# Patient Record
Sex: MALE | Race: OTHER | NOT HISPANIC OR LATINO | URBAN - METROPOLITAN AREA
[De-identification: names, ages, dates, MRNs, and addresses within clinical notes are randomized per-mention and may not be internally consistent; named-entity substitution may affect disease eponyms.]

---

## 2018-06-15 ENCOUNTER — INPATIENT (INPATIENT)
Facility: HOSPITAL | Age: 63
LOS: 0 days | Discharge: HOME | DRG: 520 | End: 2018-06-16
Attending: ORTHOPAEDIC SURGERY | Admitting: ORTHOPAEDIC SURGERY
Payer: COMMERCIAL

## 2018-06-15 VITALS
SYSTOLIC BLOOD PRESSURE: 143 MMHG | WEIGHT: 162.26 LBS | TEMPERATURE: 98 F | OXYGEN SATURATION: 97 % | DIASTOLIC BLOOD PRESSURE: 63 MMHG | HEART RATE: 81 BPM | RESPIRATION RATE: 18 BRPM

## 2018-06-15 DIAGNOSIS — F43.10 POST-TRAUMATIC STRESS DISORDER, UNSPECIFIED: ICD-10-CM

## 2018-06-15 DIAGNOSIS — Z98.890 OTHER SPECIFIED POSTPROCEDURAL STATES: Chronic | ICD-10-CM

## 2018-06-15 DIAGNOSIS — Z90.49 ACQUIRED ABSENCE OF OTHER SPECIFIED PARTS OF DIGESTIVE TRACT: Chronic | ICD-10-CM

## 2018-06-15 DIAGNOSIS — M54.16 RADICULOPATHY, LUMBAR REGION: ICD-10-CM

## 2018-06-15 DIAGNOSIS — S62.609A FRACTURE OF UNSPECIFIED PHALANX OF UNSPECIFIED FINGER, INITIAL ENCOUNTER FOR CLOSED FRACTURE: Chronic | ICD-10-CM

## 2018-06-15 LAB
ALBUMIN SERPL ELPH-MCNC: 4.2 G/DL — SIGNIFICANT CHANGE UP (ref 3.3–5)
ALP SERPL-CCNC: 59 U/L — SIGNIFICANT CHANGE UP (ref 40–120)
ALT FLD-CCNC: 16 U/L — SIGNIFICANT CHANGE UP (ref 10–45)
ANION GAP SERPL CALC-SCNC: 12 MMOL/L — SIGNIFICANT CHANGE UP (ref 5–17)
APTT BLD: 28.3 SEC — SIGNIFICANT CHANGE UP (ref 27.5–37.4)
AST SERPL-CCNC: 19 U/L — SIGNIFICANT CHANGE UP (ref 10–40)
BASOPHILS NFR BLD AUTO: 0.3 % — SIGNIFICANT CHANGE UP (ref 0–2)
BILIRUB SERPL-MCNC: 0.2 MG/DL — SIGNIFICANT CHANGE UP (ref 0.2–1.2)
BUN SERPL-MCNC: 32 MG/DL — HIGH (ref 7–23)
CALCIUM SERPL-MCNC: 9 MG/DL — SIGNIFICANT CHANGE UP (ref 8.4–10.5)
CHLORIDE SERPL-SCNC: 102 MMOL/L — SIGNIFICANT CHANGE UP (ref 96–108)
CO2 SERPL-SCNC: 26 MMOL/L — SIGNIFICANT CHANGE UP (ref 22–31)
CREAT SERPL-MCNC: 0.77 MG/DL — SIGNIFICANT CHANGE UP (ref 0.5–1.3)
EOSINOPHIL NFR BLD AUTO: 2.2 % — SIGNIFICANT CHANGE UP (ref 0–6)
GLUCOSE SERPL-MCNC: 108 MG/DL — HIGH (ref 70–99)
HCT VFR BLD CALC: 42.3 % — SIGNIFICANT CHANGE UP (ref 39–50)
HGB BLD-MCNC: 14.6 G/DL — SIGNIFICANT CHANGE UP (ref 13–17)
INR BLD: 1.04 — SIGNIFICANT CHANGE UP (ref 0.88–1.16)
LYMPHOCYTES # BLD AUTO: 32.2 % — SIGNIFICANT CHANGE UP (ref 13–44)
MCHC RBC-ENTMCNC: 29.4 PG — SIGNIFICANT CHANGE UP (ref 27–34)
MCHC RBC-ENTMCNC: 34.5 G/DL — SIGNIFICANT CHANGE UP (ref 32–36)
MCV RBC AUTO: 85.1 FL — SIGNIFICANT CHANGE UP (ref 80–100)
MONOCYTES NFR BLD AUTO: 7.9 % — SIGNIFICANT CHANGE UP (ref 2–14)
NEUTROPHILS NFR BLD AUTO: 57.4 % — SIGNIFICANT CHANGE UP (ref 43–77)
PLATELET # BLD AUTO: 209 K/UL — SIGNIFICANT CHANGE UP (ref 150–400)
POTASSIUM SERPL-MCNC: 3.8 MMOL/L — SIGNIFICANT CHANGE UP (ref 3.5–5.3)
POTASSIUM SERPL-SCNC: 3.8 MMOL/L — SIGNIFICANT CHANGE UP (ref 3.5–5.3)
PROT SERPL-MCNC: 7.3 G/DL — SIGNIFICANT CHANGE UP (ref 6–8.3)
PROTHROM AB SERPL-ACNC: 11.5 SEC — SIGNIFICANT CHANGE UP (ref 9.8–12.7)
RBC # BLD: 4.97 M/UL — SIGNIFICANT CHANGE UP (ref 4.2–5.8)
RBC # FLD: 12.5 % — SIGNIFICANT CHANGE UP (ref 10.3–16.9)
SODIUM SERPL-SCNC: 140 MMOL/L — SIGNIFICANT CHANGE UP (ref 135–145)
WBC # BLD: 10.6 K/UL — HIGH (ref 3.8–10.5)
WBC # FLD AUTO: 10.6 K/UL — HIGH (ref 3.8–10.5)

## 2018-06-15 PROCEDURE — 71045 X-RAY EXAM CHEST 1 VIEW: CPT | Mod: 26

## 2018-06-15 PROCEDURE — 93010 ELECTROCARDIOGRAM REPORT: CPT

## 2018-06-15 PROCEDURE — 99285 EMERGENCY DEPT VISIT HI MDM: CPT | Mod: 25

## 2018-06-15 RX ORDER — MORPHINE SULFATE 50 MG/1
4 CAPSULE, EXTENDED RELEASE ORAL ONCE
Qty: 0 | Refills: 0 | Status: DISCONTINUED | OUTPATIENT
Start: 2018-06-15 | End: 2018-06-15

## 2018-06-15 RX ORDER — OXYCODONE HYDROCHLORIDE 5 MG/1
5 TABLET ORAL EVERY 4 HOURS
Qty: 0 | Refills: 0 | Status: DISCONTINUED | OUTPATIENT
Start: 2018-06-15 | End: 2018-06-16

## 2018-06-15 RX ORDER — CLONAZEPAM 1 MG
1 TABLET ORAL
Qty: 0 | Refills: 0 | COMMUNITY

## 2018-06-15 RX ORDER — OXYCODONE HYDROCHLORIDE 5 MG/1
10 TABLET ORAL EVERY 4 HOURS
Qty: 0 | Refills: 0 | Status: DISCONTINUED | OUTPATIENT
Start: 2018-06-15 | End: 2018-06-16

## 2018-06-15 RX ORDER — CEFAZOLIN SODIUM 1 G
2000 VIAL (EA) INJECTION EVERY 8 HOURS
Qty: 0 | Refills: 0 | Status: COMPLETED | OUTPATIENT
Start: 2018-06-16 | End: 2018-06-16

## 2018-06-15 RX ORDER — TRAZODONE HCL 50 MG
1 TABLET ORAL
Qty: 0 | Refills: 0 | COMMUNITY

## 2018-06-15 RX ORDER — METOCLOPRAMIDE HCL 10 MG
10 TABLET ORAL EVERY 6 HOURS
Qty: 0 | Refills: 0 | Status: DISCONTINUED | OUTPATIENT
Start: 2018-06-15 | End: 2018-06-16

## 2018-06-15 RX ORDER — TRAZODONE HCL 50 MG
100 TABLET ORAL DAILY
Qty: 0 | Refills: 0 | Status: DISCONTINUED | OUTPATIENT
Start: 2018-06-15 | End: 2018-06-16

## 2018-06-15 RX ORDER — MORPHINE SULFATE 50 MG/1
4 CAPSULE, EXTENDED RELEASE ORAL EVERY 4 HOURS
Qty: 0 | Refills: 0 | Status: DISCONTINUED | OUTPATIENT
Start: 2018-06-15 | End: 2018-06-16

## 2018-06-15 RX ORDER — CLONAZEPAM 1 MG
0.5 TABLET ORAL DAILY
Qty: 0 | Refills: 0 | Status: DISCONTINUED | OUTPATIENT
Start: 2018-06-15 | End: 2018-06-16

## 2018-06-15 RX ORDER — SODIUM CHLORIDE 9 MG/ML
1000 INJECTION INTRAMUSCULAR; INTRAVENOUS; SUBCUTANEOUS
Qty: 0 | Refills: 0 | Status: DISCONTINUED | OUTPATIENT
Start: 2018-06-15 | End: 2018-06-16

## 2018-06-15 RX ADMIN — MORPHINE SULFATE 4 MILLIGRAM(S): 50 CAPSULE, EXTENDED RELEASE ORAL at 09:15

## 2018-06-15 RX ADMIN — MORPHINE SULFATE 4 MILLIGRAM(S): 50 CAPSULE, EXTENDED RELEASE ORAL at 09:06

## 2018-06-15 RX ADMIN — SODIUM CHLORIDE 100 MILLILITER(S): 9 INJECTION INTRAMUSCULAR; INTRAVENOUS; SUBCUTANEOUS at 19:55

## 2018-06-15 RX ADMIN — Medication 100 MILLIGRAM(S): at 23:19

## 2018-06-15 RX ADMIN — SODIUM CHLORIDE 100 MILLILITER(S): 9 INJECTION INTRAMUSCULAR; INTRAVENOUS; SUBCUTANEOUS at 09:05

## 2018-06-15 NOTE — DISCHARGE NOTE ADULT - MEDICATION SUMMARY - MEDICATIONS TO TAKE
I will START or STAY ON the medications listed below when I get home from the hospital:    oxyCODONE-acetaminophen 5 mg-325 mg oral tablet  -- 1 tab(s) by mouth 4 times a day, As Needed MDD:4 for Severe Pain  -- Caution federal law prohibits the transfer of this drug to any person other  than the person for whom it was prescribed.  May cause drowsiness.  Alcohol may intensify this effect.  Use care when operating dangerous machinery.  This prescription cannot be refilled.  This product contains acetaminophen.  Do not use  with any other product containing acetaminophen to prevent possible liver damage.  Using more of this medication than prescribed may cause serious breathing problems.    -- Indication: For Lumbar radiculopathy, acute    KlonoPIN 0.5 mg oral tablet  -- 1 tab(s) by mouth once a day, As Needed  -- Indication: For Home med    traZODone 100 mg oral tablet  -- 1 tab(s) by mouth once a day  -- Indication: For Home med

## 2018-06-15 NOTE — ED ADULT NURSE NOTE - OBJECTIVE STATEMENT
64 y/o male sent from PCP for evaluation of nerve pain for possible spinal surgery. Pt. reports intense pain at lumbar region whereby pain radiates down left leg. Pain prevents pt from ambulating effectively. Speaks full sentences, MAEx4, has unlabored breathing. Abd soft nt nd. Skin dry, warm.

## 2018-06-15 NOTE — DISCHARGE NOTE ADULT - PATIENT PORTAL LINK FT
You can access the [facility] Patient Portal, offered by [facility], by registering with the following website: [email or web address]

## 2018-06-15 NOTE — DISCHARGE NOTE ADULT - HOSPITAL COURSE
Admitted  Surgery 6/15/18  Jania-op Antibiotics  Pain control  DVT prophylaxis  OOB/Physical Therapy

## 2018-06-15 NOTE — DISCHARGE NOTE ADULT - PLAN OF CARE
recovery No strenuous activity, heavy lifting, driving or returning to work until cleared by MD.  You may shower - keep dressing dry, no soaking in bathtubs.  Remove dressing after post op day 5-7, then leave incision open to air. Keep incision clean and dry.  Try to have regular bowel movements, take stool softener or laxative if necessary.  May take pepcid or zantac for upset stomach.  May take Aleve or naproxen instead of celebrex.  Swelling may travel all the way down leg to foot, this is normal and will subside in a few weeks.  Follow up with Dr. Barakat to schedule an appt, if you have staples or sutures they will be removed in office.  Contact your doctor if you experience: fever greater than 101.5, chills, chest pain, difficulty breathing, redness or excessive drainage around the incision, other concerns.

## 2018-06-15 NOTE — ED PROVIDER NOTE - MEDICAL DECISION MAKING DETAILS
64 yo male with herniated lumbar disc, now with worsening pain over past few days.  no neuro symptoms.  Dr Barakat is taking for procedure today

## 2018-06-15 NOTE — ED PROVIDER NOTE - OBJECTIVE STATEMENT
hx of herniated disc in lumbar back, for past few days having severe pain going down left leg, difficulty ambulating  no bowel/bladder changes, no weakness or numbness  pt spoke with Dr Barakat this morning who told him to come to hospital for possibly surgery

## 2018-06-15 NOTE — H&P ADULT - ASSESSMENT
62 yo with L4/L5 disc herniation with progressive weakness and neuro deficits  Will need surgical repair  Good exercise tolerance and no hx of cardiac issues  Will need EKG, CXR and preop labs, if all normal can be cleared for surgery as moderate  risk candidate for moderate risk procedure  Pain meds prn  Hemodynamically stable. 64 yo with L4/L5 disc herniation

## 2018-06-15 NOTE — H&P ADULT - PSH
Fracture of finger  FINGER ORIF 4TH METATARSAL  H/O hernia repair    History of appendectomy    S/P tendon repair  2-4TH METATARSAL TENDON REPAIR Fracture of finger  FINGER ORIF 4TH metacarpal  H/O hernia repair    History of appendectomy    S/P tendon repair  2-4TH metacarpal TENDON REPAIR

## 2018-06-15 NOTE — H&P ADULT - NSHPPHYSICALEXAM_GEN_ALL_CORE
General- mild distress  Spine- normal curvature, no TTP along cervical/thoracic/lumbar spine, no paraspinal tenderness, skin intact, no erythema/ecchymosis, STS  MSK: Lower extremities, skin intact, no bony tenderness along femur/tibia/fibula General- mild distress  Spine- normal curvature, no TTP along cervical/thoracic/lumbar spine, no paraspinal tenderness, skin intact, no erythema/ecchymosis, STS  MSK: Lower extremities, skin intact, no bony tenderness along femur/tibia/fibula, meta General- mild distress  Spine- normal curvature, no TTP along cervical/thoracic/lumbar spine, no paraspinal tenderness, skin intact, no erythema/ecchymosis, STS  MSK: Lower extremities, skin intact, no bony tenderness along femur/tibia/fibula, metatarsals. SLT INTACT, DP/PT 2+, EHL/TA/GS 5/5 right le, 4/5 with pain right le, able to SLR on right le, + SLR on left le, while examining patient he has severe muscle cramping in left posterior thigh, difficulty with hip abduction on left le, wnl left le, rectal exam refused, able to stand, difficulty with walking secondary to pain

## 2018-06-15 NOTE — DISCHARGE NOTE ADULT - CARE PROVIDER_API CALL
Justice Barakat (MD), Orthopaedic Surgery  [address]  [address]  [place], [place] [address]  Phone: [phone]  Fax: [phone]

## 2018-06-15 NOTE — H&P ADULT - REASON FOR ADMISSION
Progressive LE weakness, L4/L5 Disc Herniation Progressive LE weakness, L4/L5 Disc Herniation, Unable to ambulate, Smoker

## 2018-06-15 NOTE — DISCHARGE NOTE ADULT - CARE PLAN
Principal Discharge DX:	Lumbar radiculopathy, acute  Goal:	recovery  Assessment and plan of treatment:	No strenuous activity, heavy lifting, driving or returning to work until cleared by MD.  You may shower - keep dressing dry, no soaking in bathtubs.  Remove dressing after post op day 5-7, then leave incision open to air. Keep incision clean and dry.  Try to have regular bowel movements, take stool softener or laxative if necessary.  May take pepcid or zantac for upset stomach.  May take Aleve or naproxen instead of celebrex.  Swelling may travel all the way down leg to foot, this is normal and will subside in a few weeks.  Follow up with Dr. Barakat to schedule an appt, if you have staples or sutures they will be removed in office.  Contact your doctor if you experience: fever greater than 101.5, chills, chest pain, difficulty breathing, redness or excessive drainage around the incision, other concerns.

## 2018-06-15 NOTE — ED ADULT TRIAGE NOTE - CHIEF COMPLAINT QUOTE
My Dr, Dr Hunt told me to come to the ER to be evaluated for possible spinal surgery.  No traumatic injury.

## 2018-06-15 NOTE — H&P ADULT - HISTORY OF PRESENT ILLNESS
PAST MEDICAL & SURGICAL HISTORY:      MEDICATIONS  (STANDING):  sodium chloride 0.9%. 1000 milliLiter(s) (100 mL/Hr) IV Continuous <Continuous>    MEDICATIONS  (PRN):  metoclopramide Injectable 10 milliGRAM(s) IV Push every 6 hours PRN Nausea and/or Vomiting  morphine  - Injectable 4 milliGRAM(s) IV Push every 4 hours PRN Severe Pain  oxyCODONE    IR 10 milliGRAM(s) Oral every 4 hours PRN Moderate Pain  oxyCODONE    IR 5 milliGRAM(s) Oral every 4 hours PRN Mild Pain    No Known Allergies      Vital Signs Last 24 Hrs  T(C): 36.8 (15 Fritz 2018 07:52), Max: 36.8 (15 Fritz 2018 07:52)  T(F): 98.2 (15 Fritz 2018 07:52), Max: 98.2 (15 Fritz 2018 07:52)  HR: 81 (15 Fritz 2018 07:52) (81 - 81)  BP: 143/63 (15 Fritz 2018 07:52) (143/63 - 143/63)  BP(mean): --  RR: 18 (15 Fritz 2018 07:52) (18 - 18)  SpO2: 97% (15 Fritz 2018 07:52) (97% - 97%)    Handoff  MEWS Score  BACK PAIN                                MD  consult reviewed       ROS                          + reviewed  H/P                          NO CHANGE  GENERAL                 + awake           +alert        -confused          - lethargic  CARDIOVASC          -chest pain    -palpitation         -SOB           -ZELAYA  PULMONARY          -cough        -congestion             -wheezing  ENT                           -hoarseness         -sore throat      Gastrointestinal      -nausea         -vomitting         -diarrhea          -pain                                    -incontinent       -dysuria         -frequency       -retention      MS                              -weakness      PSYCH                        +awake           -agitation         -dementia    -delerium  SKIN                            - dry              -rash           -decubitus    PHYSICAL  EXAMINATION     General: Well developed; well nourished; in no acute distress  Eyes: PERRL, EOM intact; conjunctiva and sclera clear  Head: Normocephalic; atraumatic  Neck: Supple; non tender; no masses  Respiratory: No wheezes, rales or rhonchi  Cardiovascular: Regular rate and rhythm. S1 and S2 Normal; No murmurs, gallops or rubs  Gastrointestinal: Soft non-tender non-distended; Normal bowel sounds; No hepatosplenomegaly  Extremities:  No clubbing, cyanosis or edema            Peripheral pulses palpable 2+ bilaterally  Neurological: Alert and oriented x 3, CN 2-12 grossly intact    Assesment/PLAN  63yMale    VTE prophylaxis:     reviewed  ANTIBIOTICS :         reviewed            NONE  DISPOSITION  :             HOME 62 yo Male with PMH sign for PTSD (childhood trauma), anxiety, smoker, untreated hypercholesterol presents with 10/10 Left leg pain since Tuesday with progressive weakness and unable to ambulate. He reports the pain is sharp and interferes with all activities of daily living.   He smokes 1/2 ppd for past 5 years and receives ketamine infusions every 6 weeks to treat his PTSD.  No active cardiac issues. No sign family hx of cardiac dz (F had MI but at age 80 and no contact with mother so her med history is unknown)    Pt able to ambulate >1 mile without any difficulty. No CP, SOB or palpitations.   No hx of DM.    Of note, pt does not want to stop smoking, as he feels it helps tremendously with his symptoms of PTSD. Risks reviewed with patient.     ROS as per chart.     PAST SURGICAL HISTORY:  Finger fracture   Hernia repair    MEDS:  Trazadone 100  Klonipin .5  Ketamine infusions q6 weeks      MEDICATIONS  (STANDING):  sodium chloride 0.9%. 1000 milliLiter(s) (100 mL/Hr) IV Continuous <Continuous>    MEDICATIONS  (PRN):  metoclopramide Injectable 10 milliGRAM(s) IV Push every 6 hours PRN Nausea and/or Vomiting  morphine  - Injectable 4 milliGRAM(s) IV Push every 4 hours PRN Severe Pain  oxyCODONE    IR 10 milliGRAM(s) Oral every 4 hours PRN Moderate Pain  oxyCODONE    IR 5 milliGRAM(s) Oral every 4 hours PRN Mild Pain    No Known Allergies      Vital Signs Last 24 Hrs  T(C): 36.8 (15 Fritz 2018 07:52), Max: 36.8 (15 Fritz 2018 07:52)  T(F): 98.2 (15 Fritz 2018 07:52), Max: 98.2 (15 Fritz 2018 07:52)  HR: 81 (15 Fritz 2018 07:52) (81 - 81)  BP: 143/63 (15 Fritz 2018 07:52) (143/63 - 143/63)  BP(mean): --  RR: 18 (15 Fritz 2018 07:52) (18 - 18)  SpO2: 97% (15 Fritz 2018 07:52) (97% - 97%)    Handoff  MEWS Score  BACK PAIN                                MD  consult reviewed       ROS                          + reviewed  H/P                          NO CHANGE  GENERAL                 + awake           +alert        -confused          - lethargic  CARDIOVASC          -chest pain    -palpitation         -SOB           -ZELAYA  PULMONARY          -cough        -congestion             -wheezing  ENT                           -hoarseness         -sore throat      Gastrointestinal      -nausea         -vomitting         -diarrhea          -pain                                    -incontinent       -dysuria         -frequency       -retention      MS                              +weakness (4/5)    LLE >RLE  PSYCH                        +awake           -agitation         -dementia    -delerium  SKIN                            - dry              -rash           -decubitus    PHYSICAL  EXAMINATION     General: Well developed; well nourished; in no acute distress  Eyes: PERRL, EOM intact; conjunctiva and sclera clear  Head: Normocephalic; atraumatic  Neck: Supple; non tender; no masses  Respiratory: No wheezes, rales or rhonchi  Cardiovascular: Regular rate and rhythm. S1 and S2 Normal; No murmurs, gallops or rubs  Gastrointestinal: Soft non-tender non-distended; Normal bowel sounds; No hepatosplenomegaly  Extremities:  No clubbing, cyanosis or edema            Peripheral pulses palpable 2+ bilaterally  Neurological: Alert and oriented x 3, CN 2-12 grossly intact 64 yo Male c/o low back pain/left le weakness x 1 week. Pt. states he was gardening at home for 4 hours when he started to have low back pain with radiation down left le. Pt. describes pain as stabbing/throbbing below left buttock. Pt. has difficulty with walking in which he has started to use cane assistance. Pt. states he went to see an orthopedist and had Xrays/MRI taken. Pt. was then referred to see Dr. Barakat due to MRI showing disc herniation. Pt. states pain moves below the left buttock to left thigh. Pt. "doesn't know where pain will be" and will get a sudden shock of pain with muscle cramping. Denies any bowel or bladder incontinence. Pt. reports difficulty with using the bathroom only due to positioning with left leg. Denies any numbness or tingling in b/l les.     Of note, pt does not want to stop smoking, as he feels it helps tremendously with his symptoms of PTSD. Risks reviewed with patient.     ROS as per chart.     PAST SURGICAL HISTORY:  Finger fracture   Hernia repair    MEDS:  Trazadone 100  Klonipin .5  Ketamine infusions q6 weeks      MEDICATIONS  (STANDING):  sodium chloride 0.9%. 1000 milliLiter(s) (100 mL/Hr) IV Continuous <Continuous>    MEDICATIONS  (PRN):  metoclopramide Injectable 10 milliGRAM(s) IV Push every 6 hours PRN Nausea and/or Vomiting  morphine  - Injectable 4 milliGRAM(s) IV Push every 4 hours PRN Severe Pain  oxyCODONE    IR 10 milliGRAM(s) Oral every 4 hours PRN Moderate Pain  oxyCODONE    IR 5 milliGRAM(s) Oral every 4 hours PRN Mild Pain    No Known Allergies      Vital Signs Last 24 Hrs  T(C): 36.8 (15 Fritz 2018 07:52), Max: 36.8 (15 Fritz 2018 07:52)  T(F): 98.2 (15 Fritz 2018 07:52), Max: 98.2 (15 Fritz 2018 07:52)  HR: 81 (15 Fritz 2018 07:52) (81 - 81)  BP: 143/63 (15 Fritz 2018 07:52) (143/63 - 143/63)  BP(mean): --  RR: 18 (15 Fritz 2018 07:52) (18 - 18)  SpO2: 97% (15 Fritz 2018 07:52) (97% - 97%)    Handoff  MEWS Score  BACK PAIN                                MD  consult reviewed       ROS                          + reviewed  H/P                          NO CHANGE  GENERAL                 + awake           +alert        -confused          - lethargic  CARDIOVASC          -chest pain    -palpitation         -SOB           -ZELAYA  PULMONARY          -cough        -congestion             -wheezing  ENT                           -hoarseness         -sore throat      Gastrointestinal      -nausea         -vomitting         -diarrhea          -pain                                    -incontinent       -dysuria         -frequency       -retention      MS                              +weakness (4/5)    LLE >RLE  PSYCH                        +awake           -agitation         -dementia    -delerium  SKIN                            - dry              -rash           -decubitus    PHYSICAL  EXAMINATION     General: Well developed; well nourished; in no acute distress  Eyes: PERRL, EOM intact; conjunctiva and sclera clear  Head: Normocephalic; atraumatic  Neck: Supple; non tender; no masses  Respiratory: No wheezes, rales or rhonchi  Cardiovascular: Regular rate and rhythm. S1 and S2 Normal; No murmurs, gallops or rubs  Gastrointestinal: Soft non-tender non-distended; Normal bowel sounds; No hepatosplenomegaly  Extremities:  No clubbing, cyanosis or edema            Peripheral pulses palpable 2+ bilaterally  Neurological: Alert and oriented x 3, CN 2-12 grossly intact 62 yo Male c/o low back pain/left le weakness x 1 week. Pt. states he was gardening at home for 4 hours when he started to have low back pain with radiation down left le. Pt. describes pain as stabbing/throbbing below left buttock. Pt. has difficulty with walking in which he has started to use cane assistance. Pt. states he went to see an orthopedist and had Xrays/MRI taken. Pt. was then referred to see Dr. Barakat due to MRI showing disc herniation. Pt. states pain moves below the left buttock to left thigh. Pt. "doesn't know where pain will be" and will get a sudden shock of pain with muscle cramping. Denies any bowel or bladder incontinence. Pt. reports difficulty with using the bathroom only due to positioning with left leg. Denies any numbness or tingling in b/l les. Pt. was sent to ER today and will have left L4-L5 LAMI/DISC performed due to left leg pain and not wanting to do RACHEL.

## 2018-06-15 NOTE — PROGRESS NOTE ADULT - SUBJECTIVE AND OBJECTIVE BOX
Patient seen and examined in the emergency room and admitted to [facility].  He presents with severe, incapacitating left lower extremity radicular pain and progressive weakness, now unable to bare weight on the extremity and stand erect.  He now describes that he has been unable to void.  His examnination is notable for EHL 3/5 and DF 4/5 and knee flexion 3/5 with markedly positive straight leg raise.  MRI shows spinal stenosis at L3-4 and L4-5 (severe) with superimposed disc herniation with proximal migration compressing the left sided nerve roots consistent with his acute presentation.     The condition and treatment options were discussed with the patient and his partner.  The risks, benefits and alternatives to lumbar laminotomy/micro-discectomy were discussed. The primary goal of surgery is to relieve the acute left lower extremity radicular pain while understanding that no guarantee can be made for full recovery of his neurological deficit/ progressive weakness and the underlying spinal stenosis is not being addressed.  He is aware of the possible need for further surgery at some point in the future for the stenosis, but that the primary goal of this surgery is relief of the acute, radicular pain. The complications of surgery were discussed with the patient and his partner and include, but are not limited to, wound problems, infection, bleeding, vascular injury, csf leak, recurrent displaced disc (5-10%), symptomatic spinal stenosis, instability, need for further surgery including fusion/instrumention, persistent symptoms and/or weakness, dvt, pe, mi stroke and death.  All questions were answered and informed consent was obtained.  The patient does smoke cigarrettes and he is advised to the increased complications with smokes compared to nonsmokers including but not limited to recurrent disc displacement, wound healing, infection and vascular and pulmonary issues    **Notes/documentation by others on this electronic chart may be signed by me, due to electronic record signature requirementsm, but are not reviewed for accuracy.

## 2018-06-15 NOTE — CONSULT NOTE ADULT - SUBJECTIVE AND OBJECTIVE BOX
Patient is a 63y old  Male who presents with a chief complaint of back pain and radicular symptoms in the LLE with inability to ambulate      HPI: - no bowel or bladder symptoms      Allergies  No Known Allergies      Health Issues  MEWS Score  BACK PAIN        FAMILY HISTORY:      MEDICATIONS  (STANDING):    MEDICATIONS  (PRN):      PAST MEDICAL & SURGICAL HISTORY:      Labs              Radiology:    Physical Exam    MENTAL STATUS  -Level of Consciousness- awake    Orientation- person, place time  Language- aphasia/ dysarthria- nl  Memory- recent and remote- nl      Cranial Nerve 1- 12  Pupils- equal and reactive  Eye movements-  Facial - no asymmetry   Lower CN-nl    Gait and Station- unable to walk even with assistance    MOTOR  Upper-nl  Lower- LLE weakness- L5    Reflexes-  Left AJ    Sensation- no sensory loss    Cerebellar- no tremors    vascular -no bruits    Assessment- Lumbar radiculopathy   No evidence of myelopathy or neuropathy    Plan as per DR Barakat

## 2018-06-15 NOTE — H&P ADULT - PROBLEM SELECTOR PLAN 1
Admit to orthopedics FOR L4-L5 LAMI/DISCECTOMY. CLEARED BY DR. RODRIGUEZ.   NPO/IVF, PREOP LABS, EKG. CXR, ADDED ON. Admit to orthopedics FOR L4-L5 LAMI/DISCECTOMY. CLEARED BY DR. NUÑEZ.   NPO/IVF, PREOP LABS, EKG. CXR, ADDED ON.  Dr. Nuñez examined patient in ER, pt. cleared for sx pending normal labs/cxr/ekg.

## 2018-06-16 VITALS
HEART RATE: 52 BPM | SYSTOLIC BLOOD PRESSURE: 107 MMHG | RESPIRATION RATE: 17 BRPM | DIASTOLIC BLOOD PRESSURE: 63 MMHG | OXYGEN SATURATION: 91 % | TEMPERATURE: 98 F

## 2018-06-16 RX ADMIN — OXYCODONE HYDROCHLORIDE 10 MILLIGRAM(S): 5 TABLET ORAL at 09:50

## 2018-06-16 RX ADMIN — Medication 100 MILLIGRAM(S): at 06:49

## 2018-06-16 RX ADMIN — OXYCODONE HYDROCHLORIDE 10 MILLIGRAM(S): 5 TABLET ORAL at 05:45

## 2018-06-16 RX ADMIN — OXYCODONE HYDROCHLORIDE 10 MILLIGRAM(S): 5 TABLET ORAL at 08:56

## 2018-06-16 RX ADMIN — OXYCODONE HYDROCHLORIDE 10 MILLIGRAM(S): 5 TABLET ORAL at 06:45

## 2018-06-16 NOTE — PROGRESS NOTE ADULT - SUBJECTIVE AND OBJECTIVE BOX
HPI:  64 yo Male c/o low back pain/left le weakness POD #1 SP  left L4-L5 LAMI/DISC   Sharp left pain has resolved and strength now improved 5/5.  +ambulating.   Pt has some nausea and dysphagia overnight.         PAST MEDICAL & SURGICAL HISTORY:  PTSD (post-traumatic stress disorder): from childhood trauma  S/P tendon repair: 2-4TH metacarpal TENDON REPAIR  Fracture of finger: FINGER ORIF 4TH metacarpal  H/O hernia repair  History of appendectomy      MEDICATIONS  (STANDING):  sodium chloride 0.9%. 1000 milliLiter(s) (100 mL/Hr) IV Continuous <Continuous>  traZODone 100 milliGRAM(s) Oral daily    MEDICATIONS  (PRN):  clonazePAM Tablet 0.5 milliGRAM(s) Oral daily PRN home med  metoclopramide Injectable 10 milliGRAM(s) IV Push every 6 hours PRN Nausea and/or Vomiting  morphine  - Injectable 4 milliGRAM(s) IV Push every 4 hours PRN Severe Pain  oxyCODONE    IR 10 milliGRAM(s) Oral every 4 hours PRN Moderate Pain  oxyCODONE    IR 5 milliGRAM(s) Oral every 4 hours PRN Mild Pain    No Known Allergies      Vital Signs Last 24 Hrs  T(C): 36.7 (16 Jun 2018 08:38), Max: 36.8 (15 Fritz 2018 14:17)  T(F): 98.1 (16 Jun 2018 08:38), Max: 98.3 (15 Fritz 2018 14:17)  HR: 52 (16 Jun 2018 08:38) (42 - 56)  BP: 107/63 (16 Jun 2018 08:38) (93/58 - 146/66)  BP(mean): 97 (15 Fritz 2018 21:15) (68 - 101)  RR: 17 (16 Jun 2018 08:38) (8 - 17)  SpO2: 91% (16 Jun 2018 08:38) (91% - 99%)    LUMBAR RADICULOPATH, ACUTE  Handoff  MEWS Score  PTSD (post-traumatic stress disorder)  No pertinent past medical history  Lumbar radiculopathy, acute  Lumbar radiculopathy, acute  PTSD (post-traumatic stress disorder)  Lumbar radiculopathy, acute  S/P tendon repair  Fracture of finger  H/O hernia repair  History of appendectomy  BACK PAIN      PT/INR - ( 15 Fritz 2018 09:12 )   PT: 11.5 sec;   INR: 1.04          PTT - ( 15 Fritz 2018 09:12 )  PTT:28.3 sec                                14.6   10.6  )-----------( 209      ( 15 Fritz 2018 09:12 )             42.3       06-15    140  |  102  |  32<H>  ----------------------------<  108<H>  3.8   |  26  |  0.77    Ca    9.0      15 Fritz 2018 09:12    TPro  7.3  /  Alb  4.2  /  TBili  0.2  /  DBili  x   /  AST  19  /  ALT  16  /  AlkPhos  59  06-15          MD  consult reviewed       ROS                          + reviewed  H/P                          NO CHANGE  GENERAL                 + awake           +alert        -confused          - lethargic  CARDIOVASC          -chest pain    -palpitation         -SOB           -ZELAYA  PULMONARY          -cough        -congestion             -wheezing  ENT                           -hoarseness         -sore throat      Gastrointestinal      -nausea         -vomitting         -diarrhea          -pain                                    -incontinent       -dysuria         -frequency       -retention      MS                              -weakness      PSYCH                        +awake           -agitation         -dementia    -delerium  SKIN                            - dry              -rash           -decubitus    PHYSICAL  EXAMINATION     General: Well developed; well nourished; in no acute distress  Eyes: PERRL, EOM intact; conjunctiva and sclera clear  Head: Normocephalic; atraumatic  Neck: Supple; non tender; no masses  Respiratory: No wheezes, rales or rhonchi  Cardiovascular: Regular rate and rhythm. S1 and S2 Normal; No murmurs, gallops or rubs  Gastrointestinal: Soft non-tender non-distended; Normal bowel sounds; No hepatosplenomegaly  Extremities:  No clubbing, cyanosis or edema, Anterior thigh pain - dull            Peripheral pulses palpable 2+ bilaterally  Neurological: Alert and oriented x 3, CN 2-12 grossly intact

## 2018-06-16 NOTE — PROGRESS NOTE ADULT - SUBJECTIVE AND OBJECTIVE BOX
Ortho     Pain endorsed but controlled this am. Ambulatory last night to bathroom with mild pain   Denies CP, SOB, N/V, numbness/tingling     Vital Signs Last 24 Hrs  T(C): 36.1 (06-15-18 @ 19:14), Max: 36.1 (06-15-18 @ 19:14)  T(F): 97 (06-15-18 @ 19:14), Max: 97 (06-15-18 @ 19:14)  HR: 52 (06-15-18 @ 21:15) (42 - 56)  BP: 143/83 (06-15-18 @ 21:15) (98/53 - 146/66)  BP(mean): 97 (06-15-18 @ 21:15) (68 - 101)  RR: 11 (06-15-18 @ 21:15) (8 - 15)  SpO2: 99% (06-15-18 @ 21:15) (96% - 99%)  AVSS    General: Pt Alert and oriented, NAD  DSG C/D/I  Pulses: 2+ PT  Sensation:  SILT s/s/sp/dp/t bilat  Motor: EHL/FHL/TA/GS 5/5 bilat                           14.6   10.6  )-----------( 209      ( 15 Fritz 2018 09:12 )             42.3   15 Fritz 2018 09:12    140    |  102    |  32     ----------------------------<  108    3.8     |  26     |  0.77       TPro  7.3    /  Alb  4.2    /  TBili  0.2    /  DBili  x      /  AST  19     /  ALT  16     /  AlkPhos  59     15 Fritz 2018 09:12      A/P: 63yMale s/p L L4-L5 lami/disc   - Stable  - Pain Control  - DVT ppx: SCDs  - Post op abx: Ancef   - PT, WBS:  WBAT     Ortho Pager [phone]

## 2018-06-16 NOTE — PROGRESS NOTE ADULT - SUBJECTIVE AND OBJECTIVE BOX
Did well overnight except with minimal surgical discomfort, described as mild.    No lower extremity symptoms except some lateral hip, mild discomfort over greater trochanter.  No radiating pain.  has been out of bed ambulating.  Strength in leg is significantly improved; walking upright and normal. tolerating diet; Voiding without difficulty; using incentive spirometry (I reviewed instructions personally with patient)    PE: motor 5/5 except 4/5 knee flexion and 4/5 EHL.  dressing dry and intact  sensory intact  no calf tenderness     imp;: doing well postop L4-5 laminotomy   Cleared for discharge this AM after clearance by Physical therapy  no lifting, twisting, bending  call for any questions, fevers, chills or any wound drainage  Instructed regarding smoking and postoperative issues- he will make every attempt to stop/limit  Incentive spirometer every hour while awake  follow-up in 7-10 days  follow-up with primary care this week, routine postoperative visit  cleared to shower and wound can get wet but no bath/pool until cleared; remove dressing prior to shower and new dressing after shower

## 2018-06-16 NOTE — PROVIDER CONTACT NOTE (OTHER) - SITUATION
Pt. has been jorge l in 40s since admission, questioned provider on if patient should be on telemetry. Patient also wants mayberry cathter to be discontinued.

## 2018-06-16 NOTE — PHYSICAL THERAPY INITIAL EVALUATION ADULT - DIAGNOSIS, PT EVAL
Practice Pattern 4F: Impaired joint mobility, motor function, muscle performance, or range of motion associated with spinal disorders

## 2018-06-16 NOTE — PROGRESS NOTE ADULT - ASSESSMENT
Spoke with patient (Dr. Meraz) preoperatively and he is aware I will be assisting during today's surgery and disclosure performed.
Assesment/PLAN  63yMale PTSD (post-traumatic stress disorder)     - Stable  - Pain Control  - DVT ppx: SCDs  - Post op abx: Ancef   - PT, WBS:  WBAT   -Smoking Cessation  - Follow up with PMD regarding hypercholesterol and smoking cessation plan  - Hemodynamically Stable  - Plan discussed with Ortho and Neuro attendings

## 2018-06-16 NOTE — PROGRESS NOTE ADULT - SUBJECTIVE AND OBJECTIVE BOX
Neurology Follow up note    Name  LISANDRA WEEKS    HPI:  64 yo Male c/o low back pain/left le weakness x 1 week. Pt. states he was gardening at home for 4 hours when he started to have low back pain with radiation down left le. Pt. describes pain as stabbing/throbbing below left buttock. Pt. has difficulty with walking in which he has started to use cane assistance. Pt. states he went to see an orthopedist and had Xrays/MRI taken. Pt. was then referred to see Dr. Barakat due to MRI showing disc herniation. Pt. states pain moves below the left buttock to left thigh. Pt. "doesn't know where pain will be" and will get a sudden shock of pain with muscle cramping. Denies any bowel or bladder incontinence. Pt. reports difficulty with using the bathroom only due to positioning with left leg. Denies any numbness or tingling in b/l les. Pt. was sent to ER today and will have left L4-L5 LAMI/DISC performed due to left leg pain and not wanting to do RACHEL. (15 Fritz 2018 08:36)      Interval History - significant improvement in the LE- no radicular symptoms        REVIEW OF SYSTEMS    Vital Signs Last 24 Hrs  T(C): 36.7 (16 Jun 2018 08:38), Max: 36.8 (15 Fritz 2018 14:17)  T(F): 98.1 (16 Jun 2018 08:38), Max: 98.3 (15 Fritz 2018 14:17)  HR: 52 (16 Jun 2018 08:38) (42 - 56)  BP: 107/63 (16 Jun 2018 08:38) (93/58 - 146/66)  BP(mean): 97 (15 Fritz 2018 21:15) (68 - 101)  RR: 17 (16 Jun 2018 08:38) (8 - 17)  SpO2: 91% (16 Jun 2018 08:38) (91% - 99%)    Physical Exam-     Mental Status- awake and alert    Cranial Nerves-nl    Gait and station- no foot drop    Motor- moves all 4 extremities    Reflexes-intact    Sensation- no sensory level    Coordination- no tremors    Vascular -no bruits    Medications  clonazePAM Tablet 0.5 milliGRAM(s) Oral daily PRN  metoclopramide Injectable 10 milliGRAM(s) IV Push every 6 hours PRN  morphine  - Injectable 4 milliGRAM(s) IV Push every 4 hours PRN  oxyCODONE    IR 10 milliGRAM(s) Oral every 4 hours PRN  oxyCODONE    IR 5 milliGRAM(s) Oral every 4 hours PRN  sodium chloride 0.9%. 1000 milliLiter(s) IV Continuous <Continuous>  traZODone 100 milliGRAM(s) Oral daily      Lab      Radiology    Assessment- Lumbar radiculopathy    Plan as per Dr Barakat

## 2018-06-16 NOTE — PHYSICAL THERAPY INITIAL EVALUATION ADULT - ASSISTIVE DEVICE FOR STAIR TRANSFER, REHAB EVAL
unilateral hand held assist as patient does not have railings at home, however partner will be able to provide contact guard assistance and was instructed via video patient recorded of hand placement and how to properly guard/straight cane

## 2018-06-16 NOTE — PHYSICAL THERAPY INITIAL EVALUATION ADULT - THERAPY FREQUENCY, PT EVAL
DC PT - Patient is independent for all functional mobility including bed mobility, transfers, ambulation and stair negotiation without external assistance provided and with good dynamic stability observed for all mobility. Patient is safe to return home and has no further acute skilled PT needs.

## 2018-06-16 NOTE — PHYSICAL THERAPY INITIAL EVALUATION ADULT - ADDITIONAL COMMENTS
Patient lives with his partner in an private house in [place] with no steps to enter, however with 12 steps to reach second floor where they reside. Patient also with a third floor to his house, however states that he does not need to access it. Patient's partner will be with him 24/7 for the initial few days post discharge. For the past week he has had difficulty ambulating and thus used a cane, however before was independent for all functional mobility and ADLs

## 2018-06-18 PROCEDURE — 85610 PROTHROMBIN TIME: CPT

## 2018-06-18 PROCEDURE — 85025 COMPLETE CBC W/AUTO DIFF WBC: CPT

## 2018-06-18 PROCEDURE — 96374 THER/PROPH/DIAG INJ IV PUSH: CPT

## 2018-06-18 PROCEDURE — 86901 BLOOD TYPING SEROLOGIC RH(D): CPT

## 2018-06-18 PROCEDURE — 93005 ELECTROCARDIOGRAM TRACING: CPT

## 2018-06-18 PROCEDURE — 88304 TISSUE EXAM BY PATHOLOGIST: CPT

## 2018-06-18 PROCEDURE — 80053 COMPREHEN METABOLIC PANEL: CPT

## 2018-06-18 PROCEDURE — 95940 IONM IN OPERATNG ROOM 15 MIN: CPT

## 2018-06-18 PROCEDURE — 76000 FLUOROSCOPY <1 HR PHYS/QHP: CPT

## 2018-06-18 PROCEDURE — 71045 X-RAY EXAM CHEST 1 VIEW: CPT

## 2018-06-18 PROCEDURE — 85730 THROMBOPLASTIN TIME PARTIAL: CPT

## 2018-06-18 PROCEDURE — 36415 COLL VENOUS BLD VENIPUNCTURE: CPT

## 2018-06-18 PROCEDURE — C1889: CPT

## 2018-06-18 PROCEDURE — 99285 EMERGENCY DEPT VISIT HI MDM: CPT | Mod: 25

## 2018-06-18 PROCEDURE — 86850 RBC ANTIBODY SCREEN: CPT

## 2018-06-18 PROCEDURE — 86900 BLOOD TYPING SEROLOGIC ABO: CPT

## 2018-06-18 PROCEDURE — 97161 PT EVAL LOW COMPLEX 20 MIN: CPT

## 2018-06-20 DIAGNOSIS — M48.061 SPINAL STENOSIS, LUMBAR REGION WITHOUT NEUROGENIC CLAUDICATION: ICD-10-CM

## 2018-06-20 DIAGNOSIS — M54.16 RADICULOPATHY, LUMBAR REGION: ICD-10-CM

## 2018-06-20 DIAGNOSIS — M51.26 OTHER INTERVERTEBRAL DISC DISPLACEMENT, LUMBAR REGION: ICD-10-CM

## 2018-06-24 ENCOUNTER — INPATIENT (INPATIENT)
Facility: HOSPITAL | Age: 63
LOS: 4 days | Discharge: HOME | DRG: 455 | End: 2018-06-29
Attending: ORTHOPAEDIC SURGERY | Admitting: ORTHOPAEDIC SURGERY
Payer: COMMERCIAL

## 2018-06-24 VITALS
HEART RATE: 70 BPM | RESPIRATION RATE: 18 BRPM | WEIGHT: 154.54 LBS | SYSTOLIC BLOOD PRESSURE: 154 MMHG | OXYGEN SATURATION: 98 % | DIASTOLIC BLOOD PRESSURE: 94 MMHG | TEMPERATURE: 98 F

## 2018-06-24 DIAGNOSIS — Z90.49 ACQUIRED ABSENCE OF OTHER SPECIFIED PARTS OF DIGESTIVE TRACT: Chronic | ICD-10-CM

## 2018-06-24 DIAGNOSIS — Z98.890 OTHER SPECIFIED POSTPROCEDURAL STATES: Chronic | ICD-10-CM

## 2018-06-24 DIAGNOSIS — S62.609A FRACTURE OF UNSPECIFIED PHALANX OF UNSPECIFIED FINGER, INITIAL ENCOUNTER FOR CLOSED FRACTURE: Chronic | ICD-10-CM

## 2018-06-24 LAB
ALBUMIN SERPL ELPH-MCNC: 4.4 G/DL — SIGNIFICANT CHANGE UP (ref 3.3–5)
ALP SERPL-CCNC: 62 U/L — SIGNIFICANT CHANGE UP (ref 40–120)
ALT FLD-CCNC: 21 U/L — SIGNIFICANT CHANGE UP (ref 10–45)
ANION GAP SERPL CALC-SCNC: 11 MMOL/L — SIGNIFICANT CHANGE UP (ref 5–17)
APPEARANCE UR: CLEAR — SIGNIFICANT CHANGE UP
APTT BLD: 28.8 SEC — SIGNIFICANT CHANGE UP (ref 27.5–37.4)
AST SERPL-CCNC: 17 U/L — SIGNIFICANT CHANGE UP (ref 10–40)
BASOPHILS NFR BLD AUTO: 0.4 % — SIGNIFICANT CHANGE UP (ref 0–2)
BILIRUB SERPL-MCNC: 0.2 MG/DL — SIGNIFICANT CHANGE UP (ref 0.2–1.2)
BILIRUB UR-MCNC: NEGATIVE — SIGNIFICANT CHANGE UP
BLD GP AB SCN SERPL QL: NEGATIVE — SIGNIFICANT CHANGE UP
BUN SERPL-MCNC: 26 MG/DL — HIGH (ref 7–23)
CALCIUM SERPL-MCNC: 9.6 MG/DL — SIGNIFICANT CHANGE UP (ref 8.4–10.5)
CHLORIDE SERPL-SCNC: 100 MMOL/L — SIGNIFICANT CHANGE UP (ref 96–108)
CO2 SERPL-SCNC: 27 MMOL/L — SIGNIFICANT CHANGE UP (ref 22–31)
COLOR SPEC: YELLOW — SIGNIFICANT CHANGE UP
CREAT SERPL-MCNC: 0.74 MG/DL — SIGNIFICANT CHANGE UP (ref 0.5–1.3)
CRP SERPL-MCNC: 0.57 MG/DL — HIGH (ref 0–0.4)
DIFF PNL FLD: NEGATIVE — SIGNIFICANT CHANGE UP
EOSINOPHIL NFR BLD AUTO: 1.7 % — SIGNIFICANT CHANGE UP (ref 0–6)
ERYTHROCYTE [SEDIMENTATION RATE] IN BLOOD: 22 MM/HR — HIGH
EXTRA SST TUBE: SIGNIFICANT CHANGE UP
GLUCOSE SERPL-MCNC: 108 MG/DL — HIGH (ref 70–99)
GLUCOSE UR QL: NEGATIVE — SIGNIFICANT CHANGE UP
HCT VFR BLD CALC: 46.8 % — SIGNIFICANT CHANGE UP (ref 39–50)
HGB BLD-MCNC: 15.5 G/DL — SIGNIFICANT CHANGE UP (ref 13–17)
INR BLD: 1.07 — SIGNIFICANT CHANGE UP (ref 0.88–1.16)
KETONES UR-MCNC: NEGATIVE — SIGNIFICANT CHANGE UP
LEUKOCYTE ESTERASE UR-ACNC: NEGATIVE — SIGNIFICANT CHANGE UP
LYMPHOCYTES # BLD AUTO: 23.9 % — SIGNIFICANT CHANGE UP (ref 13–44)
MCHC RBC-ENTMCNC: 28.3 PG — SIGNIFICANT CHANGE UP (ref 27–34)
MCHC RBC-ENTMCNC: 33.1 G/DL — SIGNIFICANT CHANGE UP (ref 32–36)
MCV RBC AUTO: 85.6 FL — SIGNIFICANT CHANGE UP (ref 80–100)
MONOCYTES NFR BLD AUTO: 9.6 % — SIGNIFICANT CHANGE UP (ref 2–14)
NEUTROPHILS NFR BLD AUTO: 64.4 % — SIGNIFICANT CHANGE UP (ref 43–77)
NITRITE UR-MCNC: NEGATIVE — SIGNIFICANT CHANGE UP
PH UR: 6 — SIGNIFICANT CHANGE UP (ref 5–8)
PLATELET # BLD AUTO: 292 K/UL — SIGNIFICANT CHANGE UP (ref 150–400)
POTASSIUM SERPL-MCNC: 5.1 MMOL/L — SIGNIFICANT CHANGE UP (ref 3.5–5.3)
POTASSIUM SERPL-SCNC: 5.1 MMOL/L — SIGNIFICANT CHANGE UP (ref 3.5–5.3)
PROT SERPL-MCNC: 7.9 G/DL — SIGNIFICANT CHANGE UP (ref 6–8.3)
PROT UR-MCNC: NEGATIVE MG/DL — SIGNIFICANT CHANGE UP
PROTHROM AB SERPL-ACNC: 11.9 SEC — SIGNIFICANT CHANGE UP (ref 9.8–12.7)
RBC # BLD: 5.47 M/UL — SIGNIFICANT CHANGE UP (ref 4.2–5.8)
RBC # FLD: 12.6 % — SIGNIFICANT CHANGE UP (ref 10.3–16.9)
RH IG SCN BLD-IMP: POSITIVE — SIGNIFICANT CHANGE UP
SODIUM SERPL-SCNC: 138 MMOL/L — SIGNIFICANT CHANGE UP (ref 135–145)
SP GR SPEC: 1.02 — SIGNIFICANT CHANGE UP (ref 1–1.03)
UROBILINOGEN FLD QL: 0.2 E.U./DL — SIGNIFICANT CHANGE UP
WBC # BLD: 11.5 K/UL — HIGH (ref 3.8–10.5)
WBC # FLD AUTO: 11.5 K/UL — HIGH (ref 3.8–10.5)

## 2018-06-24 PROCEDURE — 99285 EMERGENCY DEPT VISIT HI MDM: CPT | Mod: 25

## 2018-06-24 PROCEDURE — 93010 ELECTROCARDIOGRAM REPORT: CPT

## 2018-06-24 PROCEDURE — 71045 X-RAY EXAM CHEST 1 VIEW: CPT | Mod: 26

## 2018-06-24 RX ORDER — HYDROMORPHONE HYDROCHLORIDE 2 MG/ML
2 INJECTION INTRAMUSCULAR; INTRAVENOUS; SUBCUTANEOUS EVERY 4 HOURS
Qty: 0 | Refills: 0 | Status: DISCONTINUED | OUTPATIENT
Start: 2018-06-24 | End: 2018-06-25

## 2018-06-24 RX ORDER — ACETAMINOPHEN 500 MG
650 TABLET ORAL EVERY 6 HOURS
Qty: 0 | Refills: 0 | Status: DISCONTINUED | OUTPATIENT
Start: 2018-06-24 | End: 2018-06-25

## 2018-06-24 RX ORDER — DOCUSATE SODIUM 100 MG
100 CAPSULE ORAL THREE TIMES A DAY
Qty: 0 | Refills: 0 | Status: DISCONTINUED | OUTPATIENT
Start: 2018-06-24 | End: 2018-06-25

## 2018-06-24 RX ORDER — TRAZODONE HCL 50 MG
100 TABLET ORAL AT BEDTIME
Qty: 0 | Refills: 0 | Status: DISCONTINUED | OUTPATIENT
Start: 2018-06-24 | End: 2018-06-25

## 2018-06-24 RX ORDER — HYDROMORPHONE HYDROCHLORIDE 2 MG/ML
2 INJECTION INTRAMUSCULAR; INTRAVENOUS; SUBCUTANEOUS
Qty: 0 | Refills: 0 | Status: DISCONTINUED | OUTPATIENT
Start: 2018-06-24 | End: 2018-06-25

## 2018-06-24 RX ORDER — HYDROMORPHONE HYDROCHLORIDE 2 MG/ML
1 INJECTION INTRAMUSCULAR; INTRAVENOUS; SUBCUTANEOUS
Qty: 0 | Refills: 0 | Status: DISCONTINUED | OUTPATIENT
Start: 2018-06-24 | End: 2018-06-25

## 2018-06-24 RX ORDER — HYDROMORPHONE HYDROCHLORIDE 2 MG/ML
4 INJECTION INTRAMUSCULAR; INTRAVENOUS; SUBCUTANEOUS EVERY 4 HOURS
Qty: 0 | Refills: 0 | Status: DISCONTINUED | OUTPATIENT
Start: 2018-06-24 | End: 2018-06-25

## 2018-06-24 RX ORDER — SODIUM CHLORIDE 9 MG/ML
1000 INJECTION, SOLUTION INTRAVENOUS
Qty: 0 | Refills: 0 | Status: DISCONTINUED | OUTPATIENT
Start: 2018-06-24 | End: 2018-06-25

## 2018-06-24 RX ORDER — MORPHINE SULFATE 50 MG/1
4 CAPSULE, EXTENDED RELEASE ORAL ONCE
Qty: 0 | Refills: 0 | Status: DISCONTINUED | OUTPATIENT
Start: 2018-06-24 | End: 2018-06-24

## 2018-06-24 RX ORDER — HYDROMORPHONE HYDROCHLORIDE 2 MG/ML
1 INJECTION INTRAMUSCULAR; INTRAVENOUS; SUBCUTANEOUS EVERY 4 HOURS
Qty: 0 | Refills: 0 | Status: DISCONTINUED | OUTPATIENT
Start: 2018-06-24 | End: 2018-06-24

## 2018-06-24 RX ORDER — MAGNESIUM HYDROXIDE 400 MG/1
30 TABLET, CHEWABLE ORAL DAILY
Qty: 0 | Refills: 0 | Status: DISCONTINUED | OUTPATIENT
Start: 2018-06-24 | End: 2018-06-25

## 2018-06-24 RX ORDER — CLONAZEPAM 1 MG
0.5 TABLET ORAL AT BEDTIME
Qty: 0 | Refills: 0 | Status: DISCONTINUED | OUTPATIENT
Start: 2018-06-24 | End: 2018-06-25

## 2018-06-24 RX ADMIN — SODIUM CHLORIDE 80 MILLILITER(S): 9 INJECTION, SOLUTION INTRAVENOUS at 23:50

## 2018-06-24 RX ADMIN — HYDROMORPHONE HYDROCHLORIDE 4 MILLIGRAM(S): 2 INJECTION INTRAMUSCULAR; INTRAVENOUS; SUBCUTANEOUS at 23:46

## 2018-06-24 RX ADMIN — HYDROMORPHONE HYDROCHLORIDE 1 MILLIGRAM(S): 2 INJECTION INTRAMUSCULAR; INTRAVENOUS; SUBCUTANEOUS at 20:43

## 2018-06-24 RX ADMIN — HYDROMORPHONE HYDROCHLORIDE 1 MILLIGRAM(S): 2 INJECTION INTRAMUSCULAR; INTRAVENOUS; SUBCUTANEOUS at 20:28

## 2018-06-24 RX ADMIN — HYDROMORPHONE HYDROCHLORIDE 2 MILLIGRAM(S): 2 INJECTION INTRAMUSCULAR; INTRAVENOUS; SUBCUTANEOUS at 23:48

## 2018-06-24 RX ADMIN — HYDROMORPHONE HYDROCHLORIDE 4 MILLIGRAM(S): 2 INJECTION INTRAMUSCULAR; INTRAVENOUS; SUBCUTANEOUS at 22:46

## 2018-06-24 RX ADMIN — MORPHINE SULFATE 4 MILLIGRAM(S): 50 CAPSULE, EXTENDED RELEASE ORAL at 18:13

## 2018-06-24 NOTE — PATIENT PROFILE ADULT. - NS TRANSFER PATIENT BELONGINGS
Jewelry/Money (specify)/Wrist Watch/Cell Phone/PDA (specify)/Electronic Device (specify)/Clothing/tow bags; michel; 3 digital recorders

## 2018-06-24 NOTE — ED PROVIDER NOTE - ATTENDING CONTRIBUTION TO CARE
I discussed the plan of care of the patient directly with the PA while the patient was in the Emergency Department. I did not perform a face to face diagnostic evaluation on this patient. I have reviewed the ACP note and agree with the history, exam and plan of care. I spoke directly w./ dr. miller who called in patient prior to arrival and requested admission for intractable back pain in setting of recent surgery.

## 2018-06-24 NOTE — PROGRESS NOTE ADULT - SUBJECTIVE AND OBJECTIVE BOX
Ortho Preop Note    Patient is a 63y old  Male who presents with a chief complaint of back pain (24 Jun 2018 17:37)    Diagnosis: HNP L4-5, s/p lami/disc L4-5  Procedure: TLIF L4-5  Surgeon: Roshni Trent5   11.5  )-----------( 292      ( 24 Jun 2018 17:33 )             46.8     06-24    138  |  100  |  26<H>  ----------------------------<  108<H>  5.1   |  27  |  0.74    Ca    9.6      24 Jun 2018 17:33    TPro  7.9  /  Alb  4.4  /  TBili  0.2  /  DBili  x   /  AST  17  /  ALT  21  /  AlkPhos  62  06-24    PT/INR - ( 24 Jun 2018 17:33 )   PT: 11.9 sec;   INR: 1.07          PTT - ( 24 Jun 2018 17:33 )  PTT:28.8 sec      [x ] Type & Screen  [x ] CBC  [x ] BMP  [x ] PT/PTT/INR  [ ] Urinalysis  [x ] Chest X-ray  [x ] EKG  [x ] NPO/IVF  [x ] Consent  [ ] Clearance by Dr. 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

## 2018-06-24 NOTE — H&P ADULT - NSHPPHYSICALEXAM_GEN_ALL_CORE
NAD, AOx3, comfortable  L spine: well healed lumbar incision, no erythema/drainage/dehiscence  Motor: 5/5 L2-S1 b/l  Sensory: SILT b/l  Pulses: wwp b/l

## 2018-06-24 NOTE — ED PROVIDER NOTE - MEDICAL DECISION MAKING DETAILS
pt known to have disc issue will have discectomy and fusion in AM I have discussed the discharge plan with the patient. The patient agrees with the plan, as discussed.  The patient understands Emergency Department diagnosis is a preliminary diagnosis often based on limited information and that the patient must adhere to the follow-up plan as discussed.  The patient understands that if the symptoms worsen or if prescribed medications do not have the desired/planned effect that the patient may return to the Emergency Department at any time for further evaluation and treatment.

## 2018-06-24 NOTE — CONSULT NOTE ADULT - SUBJECTIVE AND OBJECTIVE BOX
HPI:  63M s/p Lami/disc L4-5 on 6/15 by Dr. Barakat p/w worsening back pain. Pt underwent discectomy for HNP on 6/15 and was discharged w/o complication. However over last few days pt reported worsening back/radicular pain.  New MRI was obtained and showed a re-herniation of L4-5 disc. Denies injury/trauma, numbness/weakness, fever/chills.  Sent in for preop for TLIF L4-5 tomorrow, seen and examined in ER.     PAST MEDICAL & SURGICAL HISTORY:  PTSD (post-traumatic stress disorder): from childhood trauma  S/P tendon repair: 2-4TH metacarpal TENDON REPAIR  Fracture of finger: FINGER ORIF 4TH metacarpal  H/O hernia repair  History of appendectomy      MEDICATIONS  (STANDING):  clonazePAM Tablet 0.5 milliGRAM(s) Oral at bedtime  docusate sodium 100 milliGRAM(s) Oral three times a day  lactated ringers. 1000 milliLiter(s) (80 mL/Hr) IV Continuous <Continuous>  traZODone 100 milliGRAM(s) Oral at bedtime    MEDICATIONS  (PRN):  acetaminophen   Tablet 650 milliGRAM(s) Oral every 6 hours PRN For Temp greater than 38 C (100.4 F)  HYDROmorphone   Tablet 4 milliGRAM(s) Oral every 4 hours PRN Moderate Pain (4 - 6)  HYDROmorphone   Tablet 2 milliGRAM(s) Oral every 4 hours PRN Mild Pain (1 - 3)  HYDROmorphone  Injectable 1 milliGRAM(s) IV Push every 4 hours PRN Severe Pain (7 - 10)  magnesium hydroxide Suspension 30 milliLiter(s) Oral daily PRN Constipation    No Known Allergies      Vital Signs Last 24 Hrs  T(C): 37.1 (24 Jun 2018 19:56), Max: 37.1 (24 Jun 2018 19:56)  T(F): 98.7 (24 Jun 2018 19:56), Max: 98.7 (24 Jun 2018 19:56)  HR: 62 (24 Jun 2018 19:56) (62 - 70)  BP: 163/89 (24 Jun 2018 19:56) (131/71 - 163/89)  BP(mean): --  RR: 18 (24 Jun 2018 19:56) (18 - 18)  SpO2: 97% (24 Jun 2018 19:56) (97% - 98%)    DISC DISORDER  Handoff  MEWS Score  PTSD (post-traumatic stress disorder)  No pertinent past medical history  Disc disorder  S/P tendon repair  Fracture of finger  H/O hernia repair  History of appendectomy  LEG PAIN  8      PT/INR - ( 24 Jun 2018 17:33 )   PT: 11.9 sec;   INR: 1.07          PTT - ( 24 Jun 2018 17:33 )  PTT:28.8 sec                                15.5   11.5  )-----------( 292      ( 24 Jun 2018 17:33 )             46.8       06-24    138  |  100  |  26<H>  ----------------------------<  108<H>  5.1   |  27  |  0.74    Ca    9.6      24 Jun 2018 17:33    TPro  7.9  /  Alb  4.4  /  TBili  0.2  /  DBili  x   /  AST  17  /  ALT  21  /  AlkPhos  62  06-24          MD  consult reviewed       ROS                          + reviewed  H/P                          NO CHANGE  GENERAL                 + awake           +alert        -confused          - lethargic  CARDIOVASC          -chest pain    -palpitation         -SOB           -ZELAYA  PULMONARY          -cough        -congestion             -wheezing  ENT                           -hoarseness         -sore throat      Gastrointestinal      -nausea         -vomitting         -diarrhea          -pain                                    -incontinent       -dysuria         -frequency       -retention      MS                              -weakness      PSYCH                        +awake           -agitation         -dementia    -delerium  SKIN                            - dry              -rash           -decubitus    PHYSICAL  EXAMINATION     General: Well developed; well nourished; in no acute distress  Eyes: PERRL, EOM intact; conjunctiva and sclera clear  Head: Normocephalic; atraumatic  Neck: Supple; non tender; no masses  Respiratory: No wheezes, rales or rhonchi  Cardiovascular: Regular rate and rhythm. S1 and S2 Normal; No murmurs, gallops or rubs  Gastrointestinal: Soft non-tender non-distended; Normal bowel sounds; No hepatosplenomegaly  Extremities:  No clubbing, cyanosis or edema            Peripheral pulses palpable 2+ bilaterally  Neurological: Alert and oriented x 3, CN 2-12 grossly intact    Assesment/PLAN  63yMale PTSD (post-traumatic stress disorder)  No pertinent past medical history     VTE prophylaxis:     reviewed  ANTIBIOTICS :         reviewed            NONE  DISPOSITION  :             HOME HPI:  63M s/p Lami/disc L4-5 on 6/15 by Dr. Barakat p/w worsening back pain. Pt underwent discectomy for HNP on 6/15 and was discharged w/o complication. However over last few days pt reported worsening back/radicular pain.  New MRI was obtained and showed a re-herniation of L4-5 disc. Denies injury/trauma, numbness/weakness, fever/chills.  Sent in for preop for TLIF L4-5 tomorrow, seen and examined in ER.     Pt reports he has numbness of Lfoot. Left leg pain is less sharp that prior to his recent surgery but now is more constant and throbbing.     No CP/SOB/fevers/V/D  Ros as per chart and patient    PAST MEDICAL & SURGICAL HISTORY:  PTSD (post-traumatic stress disorder): from childhood trauma  S/P tendon repair: 2-4TH metacarpal TENDON REPAIR  Fracture of finger: FINGER ORIF 4TH metacarpal  H/O hernia repair  History of appendectomy      MEDICATIONS  (STANDING):  clonazePAM Tablet 0.5 milliGRAM(s) Oral at bedtime  docusate sodium 100 milliGRAM(s) Oral three times a day  lactated ringers. 1000 milliLiter(s) (80 mL/Hr) IV Continuous <Continuous>  traZODone 100 milliGRAM(s) Oral at bedtime    MEDICATIONS  (PRN):  acetaminophen   Tablet 650 milliGRAM(s) Oral every 6 hours PRN For Temp greater than 38 C (100.4 F)  HYDROmorphone   Tablet 4 milliGRAM(s) Oral every 4 hours PRN Moderate Pain (4 - 6)  HYDROmorphone   Tablet 2 milliGRAM(s) Oral every 4 hours PRN Mild Pain (1 - 3)  HYDROmorphone  Injectable 1 milliGRAM(s) IV Push every 4 hours PRN Severe Pain (7 - 10)  magnesium hydroxide Suspension 30 milliLiter(s) Oral daily PRN Constipation    No Known Allergies      Vital Signs Last 24 Hrs  T(C): 37.1 (24 Jun 2018 19:56), Max: 37.1 (24 Jun 2018 19:56)  T(F): 98.7 (24 Jun 2018 19:56), Max: 98.7 (24 Jun 2018 19:56)  HR: 62 (24 Jun 2018 19:56) (62 - 70)  BP: 163/89 (24 Jun 2018 19:56) (131/71 - 163/89)  BP(mean): --  RR: 18 (24 Jun 2018 19:56) (18 - 18)  SpO2: 97% (24 Jun 2018 19:56) (97% - 98%)    DISC DISORDER  Handoff  MEWS Score  PTSD (post-traumatic stress disorder)  No pertinent past medical history  Disc disorder  S/P tendon repair  Fracture of finger  H/O hernia repair  History of appendectomy  LEG PAIN  8      PT/INR - ( 24 Jun 2018 17:33 )   PT: 11.9 sec;   INR: 1.07          PTT - ( 24 Jun 2018 17:33 )  PTT:28.8 sec                                15.5   11.5  )-----------( 292      ( 24 Jun 2018 17:33 )             46.8       06-24    138  |  100  |  26<H>  ----------------------------<  108<H>  5.1   |  27  |  0.74    Ca    9.6      24 Jun 2018 17:33    TPro  7.9  /  Alb  4.4  /  TBili  0.2  /  DBili  x   /  AST  17  /  ALT  21  /  AlkPhos  62  06-24          MD  consult reviewed       ROS                          + reviewed  H/P                          NO CHANGE  GENERAL                 + awake           +alert        -confused          - lethargic  CARDIOVASC          -chest pain    -palpitation         -SOB           -ZELAYA  PULMONARY          -cough        -congestion             -wheezing  ENT                           -hoarseness         -sore throat      Gastrointestinal      -nausea         vomiting         -diarrhea          -pain                                    -incontinent       -dysuria         -frequency       -retention      MS                              -weakness      PSYCH                        +awake           -agitation         -dementia    -delerium  SKIN                            - dry              -rash           -decubitus    PHYSICAL  EXAMINATION     General: Well developed; well nourished; in no acute distress  Eyes: PERRL, EOM intact; conjunctiva and sclera clear  Head: Normocephalic; atraumatic  Neck: Supple; non tender; no masses  Respiratory: No wheezes, rales or rhonchi  Cardiovascular: Regular rate and rhythm. S1 and S2 Normal; No murmurs, gallops or rubs  Gastrointestinal: Soft non-tender non-distended; Normal bowel sounds; No hepatosplenomegaly  Extremities:  No clubbing, cyanosis or edema, Left Leg weakness 4/5            Peripheral pulses palpable 2+ bilaterally  Neurological: Alert and oriented x 3, CN 2-12 grossly intact

## 2018-06-24 NOTE — H&P ADULT - NSHPLABSRESULTS_GEN_ALL_CORE
Vital Signs Last 24 Hrs  T(C): 36.6 (24 Jun 2018 16:24), Max: 36.6 (24 Jun 2018 16:24)  T(F): 97.8 (24 Jun 2018 16:24), Max: 97.8 (24 Jun 2018 16:24)  HR: 70 (24 Jun 2018 16:24) (70 - 70)  BP: 154/94 (24 Jun 2018 16:24) (154/94 - 154/94)  BP(mean): --  RR: 18 (24 Jun 2018 16:24) (18 - 18)  SpO2: 98% (24 Jun 2018 16:24) (98% - 98%)

## 2018-06-24 NOTE — H&P ADULT - HISTORY OF PRESENT ILLNESS
63M s/p Lami/disc L4-5 on 6/15 by Dr. Barakat p/w worsening back pain. Pt underwent discectomy for HNP on 6/15 and was discharged w/o complication. However over last few days pt reported worsening back/radicular pain.  New MRI was obtained and showed a re-herniation of L4-5 disc. Denies injury/trauma, numbness/weakness, fever/chills.  Sent in for preop for TLIF L4-5 on Monday 6/25.

## 2018-06-24 NOTE — CONSULT NOTE ADULT - ASSESSMENT
Assessment and Plan:     62 yo with recurrent L4/L5 disc herniation with throbbing pain, weakness and numbness L leg - for TLIF tomorrow.   Smoker but good exercise tolerance and no hx of cardiac issues  EKG, CXR and preop labs reviewed.   Hypertension likey secondary to pain, will follow  Mildly elevated inflammatory markers consistent with post op changes  No fevers or evidence of infection on MRI  Moderate risk candidate for moderate risk procedure  Pain meds prn  Continue Trazadone and Clonipim  Hemodynamically stable.

## 2018-06-24 NOTE — ED ADULT NURSE NOTE - OBJECTIVE STATEMENT
64 y/o male was sent to ED by Dr. Barakat for admission for reherniation s/p spinal surgery last week. Pt is c/o left sided back pain radiating down left leg. Denies numbness, tingling, weakness, incontinence of stool/urine and any other associated symptoms.

## 2018-06-24 NOTE — ED PROVIDER NOTE - DIAGNOSTIC INTERPRETATION
ER Physician assistant :Kunal Ahumada PA-C   CHEST XRAY INTERPRETATION: lungs clear, heart shadow normal, bony structures intact

## 2018-06-24 NOTE — H&P ADULT - ASSESSMENT
A/P  Pt 63yMale  s/p lami/disc L4-5 on 6/15 with re-herniation, for TLIF 6/25  Admit to Orthopaedics  NPO at midnight  IVF  Pain control  Hold anticoagulation for OR  Labs  UA  type and screen  CXR   EKG  Medical Clearance with Dr. Nuñez  Consented for TLIF L4-5, possible PSF  d/w attending

## 2018-06-24 NOTE — ED PROVIDER NOTE - OBJECTIVE STATEMENT
pt to ed co pain to lower back no fall no loss of bladder no r bowel no weakness pain non radiating no numbness sudden onset no alleviating factors nor prior treatment  able to walk with pain more with change of position and bending no numbness no fever no dizzy no headache no chills no NVD no chest pain no SOB no shakes no aches no other  injury no other complaints pt has known disc and needs surgery will admit

## 2018-06-24 NOTE — PATIENT PROFILE ADULT. - FALLEN IN THE PAST
"Chief Complaint   Patient presents with     Recheck Medication     Pt is in to discuss her refill of Klonopin.       Initial /76 (BP Location: Right arm, Patient Position: Chair, Cuff Size: Adult Regular)  Pulse 91  Temp 98.2  F (36.8  C) (Tympanic)  Resp 16  Ht 5' 2.5\" (1.588 m)  Wt 126 lb 3.2 oz (57.2 kg)  SpO2 94%  BMI 22.71 kg/m2 Estimated body mass index is 22.71 kg/(m^2) as calculated from the following:    Height as of this encounter: 5' 2.5\" (1.588 m).    Weight as of this encounter: 126 lb 3.2 oz (57.2 kg).  Medication Reconciliation: complete   Kim Ames    "
no

## 2018-06-24 NOTE — ED ADULT NURSE NOTE - PSH
Fracture of finger  FINGER ORIF 4TH metacarpal  H/O hernia repair    History of appendectomy    S/P tendon repair  2-4TH metacarpal TENDON REPAIR

## 2018-06-24 NOTE — PROGRESS NOTE ADULT - SUBJECTIVE AND OBJECTIVE BOX
Patient seen and examined and MRI reviewed. Patient presents to [facility] emergency room in severe, unremitting pain in the left lower extremity.  Patient examined and MRI from yesterday reviewed.  Examination noted for  DF and EHL 4/5, knee flexion 3/5 with markedly positive straight leg raise.  patient did well postoperative urgent laminectomy Friday 6/15/18 for about 24 to 36 hours when symptoms began to recur.  He is unable to bear weight without assistance at this time.  Denies bowel or bladder dysfunction.  The condition and treatment options were discussed with the patient and his significant other  The risks, benefits and alternatives to revision lumbar laminotomy and transforaminal interbody and posterolateral fusion with bone morphogenic protein, donated bone graft and pedicle screws/rods and interbody spacer were discussed. The primary goal of surgery is to relieve the left lower extremity radicular pain and prevent reherniation but he understands that no guarantee can be made for full recovery of his neurological deficit/ weakness.  He is a smoker and the risks of smoking for healing and fusion were also discussed.  The complications of surgery were again discussed and include, but are not limited to, wound problems, infection, bleeding, vascular injury, csf leak, hardware failure, loss of fixation, junctional/adjacent level disease, nonunion, pseudarthrosis, need for further surgery, persistent symptoms dvt, pe, mi stroke and death.      **Notes/documentation by others may be signed by me but were not reviewed for accuracy due to electronic record signature requirements.

## 2018-06-25 PROBLEM — F43.10 POST-TRAUMATIC STRESS DISORDER, UNSPECIFIED: Chronic | Status: ACTIVE | Noted: 2018-06-15

## 2018-06-25 LAB
ANION GAP SERPL CALC-SCNC: 11 MMOL/L — SIGNIFICANT CHANGE UP (ref 5–17)
BASOPHILS NFR BLD AUTO: 0.5 % — SIGNIFICANT CHANGE UP (ref 0–2)
BUN SERPL-MCNC: 19 MG/DL — SIGNIFICANT CHANGE UP (ref 7–23)
CALCIUM SERPL-MCNC: 9.3 MG/DL — SIGNIFICANT CHANGE UP (ref 8.4–10.5)
CHLORIDE SERPL-SCNC: 98 MMOL/L — SIGNIFICANT CHANGE UP (ref 96–108)
CO2 SERPL-SCNC: 27 MMOL/L — SIGNIFICANT CHANGE UP (ref 22–31)
CREAT SERPL-MCNC: 0.7 MG/DL — SIGNIFICANT CHANGE UP (ref 0.5–1.3)
EOSINOPHIL NFR BLD AUTO: 3.8 % — SIGNIFICANT CHANGE UP (ref 0–6)
GLUCOSE SERPL-MCNC: 111 MG/DL — HIGH (ref 70–99)
HCT VFR BLD CALC: 45.3 % — SIGNIFICANT CHANGE UP (ref 39–50)
HGB BLD-MCNC: 15 G/DL — SIGNIFICANT CHANGE UP (ref 13–17)
LYMPHOCYTES # BLD AUTO: 29.8 % — SIGNIFICANT CHANGE UP (ref 13–44)
MCHC RBC-ENTMCNC: 28.5 PG — SIGNIFICANT CHANGE UP (ref 27–34)
MCHC RBC-ENTMCNC: 33.1 G/DL — SIGNIFICANT CHANGE UP (ref 32–36)
MCV RBC AUTO: 86.1 FL — SIGNIFICANT CHANGE UP (ref 80–100)
MONOCYTES NFR BLD AUTO: 11.3 % — SIGNIFICANT CHANGE UP (ref 2–14)
NEUTROPHILS NFR BLD AUTO: 54.6 % — SIGNIFICANT CHANGE UP (ref 43–77)
PLATELET # BLD AUTO: 279 K/UL — SIGNIFICANT CHANGE UP (ref 150–400)
POTASSIUM SERPL-MCNC: 4.3 MMOL/L — SIGNIFICANT CHANGE UP (ref 3.5–5.3)
POTASSIUM SERPL-SCNC: 4.3 MMOL/L — SIGNIFICANT CHANGE UP (ref 3.5–5.3)
RBC # BLD: 5.26 M/UL — SIGNIFICANT CHANGE UP (ref 4.2–5.8)
RBC # FLD: 12.5 % — SIGNIFICANT CHANGE UP (ref 10.3–16.9)
SODIUM SERPL-SCNC: 136 MMOL/L — SIGNIFICANT CHANGE UP (ref 135–145)
SURGICAL PATHOLOGY STUDY: SIGNIFICANT CHANGE UP
WBC # BLD: 13.2 K/UL — HIGH (ref 3.8–10.5)
WBC # FLD AUTO: 13.2 K/UL — HIGH (ref 3.8–10.5)

## 2018-06-25 RX ORDER — TRAZODONE HCL 50 MG
100 TABLET ORAL AT BEDTIME
Qty: 0 | Refills: 0 | Status: DISCONTINUED | OUTPATIENT
Start: 2018-06-25 | End: 2018-06-29

## 2018-06-25 RX ORDER — DOCUSATE SODIUM 100 MG
100 CAPSULE ORAL THREE TIMES A DAY
Qty: 0 | Refills: 0 | Status: DISCONTINUED | OUTPATIENT
Start: 2018-06-25 | End: 2018-06-29

## 2018-06-25 RX ORDER — CLONAZEPAM 1 MG
0.5 TABLET ORAL AT BEDTIME
Qty: 0 | Refills: 0 | Status: DISCONTINUED | OUTPATIENT
Start: 2018-06-25 | End: 2018-06-29

## 2018-06-25 RX ORDER — MAGNESIUM HYDROXIDE 400 MG/1
30 TABLET, CHEWABLE ORAL DAILY
Qty: 0 | Refills: 0 | Status: DISCONTINUED | OUTPATIENT
Start: 2018-06-25 | End: 2018-06-29

## 2018-06-25 RX ORDER — DIAZEPAM 5 MG
5 TABLET ORAL EVERY 8 HOURS
Qty: 0 | Refills: 0 | Status: DISCONTINUED | OUTPATIENT
Start: 2018-06-25 | End: 2018-06-29

## 2018-06-25 RX ORDER — HYDROMORPHONE HYDROCHLORIDE 2 MG/ML
6 INJECTION INTRAMUSCULAR; INTRAVENOUS; SUBCUTANEOUS EVERY 4 HOURS
Qty: 0 | Refills: 0 | Status: DISCONTINUED | OUTPATIENT
Start: 2018-06-25 | End: 2018-06-25

## 2018-06-25 RX ORDER — MORPHINE SULFATE 50 MG/1
30 CAPSULE, EXTENDED RELEASE ORAL
Qty: 0 | Refills: 0 | Status: DISCONTINUED | OUTPATIENT
Start: 2018-06-25 | End: 2018-06-26

## 2018-06-25 RX ORDER — NALOXONE HYDROCHLORIDE 4 MG/.1ML
0.1 SPRAY NASAL
Qty: 0 | Refills: 0 | Status: DISCONTINUED | OUTPATIENT
Start: 2018-06-25 | End: 2018-06-25

## 2018-06-25 RX ORDER — ACETAMINOPHEN 500 MG
650 TABLET ORAL EVERY 6 HOURS
Qty: 0 | Refills: 0 | Status: DISCONTINUED | OUTPATIENT
Start: 2018-06-25 | End: 2018-06-29

## 2018-06-25 RX ORDER — HYDROMORPHONE HYDROCHLORIDE 2 MG/ML
4 INJECTION INTRAMUSCULAR; INTRAVENOUS; SUBCUTANEOUS EVERY 4 HOURS
Qty: 0 | Refills: 0 | Status: DISCONTINUED | OUTPATIENT
Start: 2018-06-25 | End: 2018-06-25

## 2018-06-25 RX ORDER — CEFAZOLIN SODIUM 1 G
2000 VIAL (EA) INJECTION EVERY 8 HOURS
Qty: 0 | Refills: 0 | Status: COMPLETED | OUTPATIENT
Start: 2018-06-25 | End: 2018-06-26

## 2018-06-25 RX ORDER — SODIUM CHLORIDE 9 MG/ML
1000 INJECTION, SOLUTION INTRAVENOUS
Qty: 0 | Refills: 0 | Status: DISCONTINUED | OUTPATIENT
Start: 2018-06-25 | End: 2018-06-29

## 2018-06-25 RX ORDER — ONDANSETRON 8 MG/1
4 TABLET, FILM COATED ORAL EVERY 6 HOURS
Qty: 0 | Refills: 0 | Status: DISCONTINUED | OUTPATIENT
Start: 2018-06-25 | End: 2018-06-25

## 2018-06-25 RX ORDER — MORPHINE SULFATE 50 MG/1
30 CAPSULE, EXTENDED RELEASE ORAL
Qty: 0 | Refills: 0 | Status: DISCONTINUED | OUTPATIENT
Start: 2018-06-25 | End: 2018-06-25

## 2018-06-25 RX ORDER — NALOXONE HYDROCHLORIDE 4 MG/.1ML
0.1 SPRAY NASAL
Qty: 0 | Refills: 0 | Status: DISCONTINUED | OUTPATIENT
Start: 2018-06-25 | End: 2018-06-29

## 2018-06-25 RX ORDER — MORPHINE SULFATE 50 MG/1
6 CAPSULE, EXTENDED RELEASE ORAL
Qty: 0 | Refills: 0 | Status: DISCONTINUED | OUTPATIENT
Start: 2018-06-25 | End: 2018-06-25

## 2018-06-25 RX ORDER — ONDANSETRON 8 MG/1
4 TABLET, FILM COATED ORAL EVERY 6 HOURS
Qty: 0 | Refills: 0 | Status: DISCONTINUED | OUTPATIENT
Start: 2018-06-25 | End: 2018-06-29

## 2018-06-25 RX ORDER — MORPHINE SULFATE 50 MG/1
6 CAPSULE, EXTENDED RELEASE ORAL
Qty: 0 | Refills: 0 | Status: DISCONTINUED | OUTPATIENT
Start: 2018-06-25 | End: 2018-06-28

## 2018-06-25 RX ORDER — METOCLOPRAMIDE HCL 10 MG
10 TABLET ORAL EVERY 6 HOURS
Qty: 0 | Refills: 0 | Status: DISCONTINUED | OUTPATIENT
Start: 2018-06-25 | End: 2018-06-29

## 2018-06-25 RX ADMIN — Medication 0.5 MILLIGRAM(S): at 21:47

## 2018-06-25 RX ADMIN — HYDROMORPHONE HYDROCHLORIDE 1 MILLIGRAM(S): 2 INJECTION INTRAMUSCULAR; INTRAVENOUS; SUBCUTANEOUS at 09:10

## 2018-06-25 RX ADMIN — HYDROMORPHONE HYDROCHLORIDE 4 MILLIGRAM(S): 2 INJECTION INTRAMUSCULAR; INTRAVENOUS; SUBCUTANEOUS at 04:20

## 2018-06-25 RX ADMIN — HYDROMORPHONE HYDROCHLORIDE 1 MILLIGRAM(S): 2 INJECTION INTRAMUSCULAR; INTRAVENOUS; SUBCUTANEOUS at 05:36

## 2018-06-25 RX ADMIN — HYDROMORPHONE HYDROCHLORIDE 1 MILLIGRAM(S): 2 INJECTION INTRAMUSCULAR; INTRAVENOUS; SUBCUTANEOUS at 01:26

## 2018-06-25 RX ADMIN — HYDROMORPHONE HYDROCHLORIDE 2 MILLIGRAM(S): 2 INJECTION INTRAMUSCULAR; INTRAVENOUS; SUBCUTANEOUS at 10:40

## 2018-06-25 RX ADMIN — MORPHINE SULFATE 6 MILLIGRAM(S): 50 CAPSULE, EXTENDED RELEASE ORAL at 18:03

## 2018-06-25 RX ADMIN — Medication 100 MILLIGRAM(S): at 00:20

## 2018-06-25 RX ADMIN — HYDROMORPHONE HYDROCHLORIDE 2 MILLIGRAM(S): 2 INJECTION INTRAMUSCULAR; INTRAVENOUS; SUBCUTANEOUS at 00:03

## 2018-06-25 RX ADMIN — HYDROMORPHONE HYDROCHLORIDE 1 MILLIGRAM(S): 2 INJECTION INTRAMUSCULAR; INTRAVENOUS; SUBCUTANEOUS at 05:51

## 2018-06-25 RX ADMIN — HYDROMORPHONE HYDROCHLORIDE 1 MILLIGRAM(S): 2 INJECTION INTRAMUSCULAR; INTRAVENOUS; SUBCUTANEOUS at 01:50

## 2018-06-25 RX ADMIN — HYDROMORPHONE HYDROCHLORIDE 2 MILLIGRAM(S): 2 INJECTION INTRAMUSCULAR; INTRAVENOUS; SUBCUTANEOUS at 10:55

## 2018-06-25 RX ADMIN — HYDROMORPHONE HYDROCHLORIDE 1 MILLIGRAM(S): 2 INJECTION INTRAMUSCULAR; INTRAVENOUS; SUBCUTANEOUS at 08:55

## 2018-06-25 RX ADMIN — Medication 0.5 MILLIGRAM(S): at 00:20

## 2018-06-25 RX ADMIN — HYDROMORPHONE HYDROCHLORIDE 2 MILLIGRAM(S): 2 INJECTION INTRAMUSCULAR; INTRAVENOUS; SUBCUTANEOUS at 03:24

## 2018-06-25 RX ADMIN — HYDROMORPHONE HYDROCHLORIDE 2 MILLIGRAM(S): 2 INJECTION INTRAMUSCULAR; INTRAVENOUS; SUBCUTANEOUS at 06:48

## 2018-06-25 RX ADMIN — Medication 100 MILLIGRAM(S): at 21:48

## 2018-06-25 RX ADMIN — Medication 100 MILLIGRAM(S): at 21:47

## 2018-06-25 RX ADMIN — HYDROMORPHONE HYDROCHLORIDE 2 MILLIGRAM(S): 2 INJECTION INTRAMUSCULAR; INTRAVENOUS; SUBCUTANEOUS at 06:33

## 2018-06-25 RX ADMIN — HYDROMORPHONE HYDROCHLORIDE 4 MILLIGRAM(S): 2 INJECTION INTRAMUSCULAR; INTRAVENOUS; SUBCUTANEOUS at 05:20

## 2018-06-25 RX ADMIN — MORPHINE SULFATE 30 MILLILITER(S): 50 CAPSULE, EXTENDED RELEASE ORAL at 17:42

## 2018-06-25 RX ADMIN — HYDROMORPHONE HYDROCHLORIDE 2 MILLIGRAM(S): 2 INJECTION INTRAMUSCULAR; INTRAVENOUS; SUBCUTANEOUS at 03:39

## 2018-06-25 NOTE — DISCHARGE NOTE ADULT - CARE PLAN
Goal:	Improvement  Assessment and plan of treatment:	See below Principal Discharge DX:	Disc disorder  Goal:	Improvement after surgery  Assessment and plan of treatment:	See below

## 2018-06-25 NOTE — PROGRESS NOTE ADULT - SUBJECTIVE AND OBJECTIVE BOX
ORTHO NOTE    [x] Pt seen/examined.  [ ] Pt without any complaints/in NAD.    [x] Pt complains of: Lower back pain which radiates down LLE. Pain is currently 9/10. Patient states pain is poorly controlled with use of Dilaudid (current regimen: Dilaudid 4mg po every 4 hours for mild pain, Dilaudid 6mg po every 4 hours for moderate pain, Dilaudid 1mg IV push every 3 hours for severe pain, Dilaudid 2mg IV push every 3 hours for break thru pain). Patient states he thinks Morphine may have provided him with better control of back pain during last hospitalization.         ROS: [ ] Fever  [ ] Chills  [ ] CP [ ] SOB [ ] Dysnea  [ ] Palpitations [ ] Cough [ ] N/V/C/D [ ] Paresthia [ ] Other     [x] ROS  otherwise negative        PHYSICAL EXAM:    Vital Signs Last 24 Hrs  T(C): 36.2 (25 Jun 2018 09:56), Max: 37.1 (24 Jun 2018 19:56)  T(F): 97.2 (25 Jun 2018 09:56), Max: 98.7 (24 Jun 2018 19:56)  HR: 58 (25 Jun 2018 09:56) (50 - 70)  BP: 174/88 (25 Jun 2018 09:56) (131/71 - 180/90)  BP(mean): --  RR: 18 (25 Jun 2018 09:56) (18 - 18)  SpO2: 95% (25 Jun 2018 09:56) (95% - 98%)    I&O's Detail    24 Jun 2018 07:01  -  25 Jun 2018 07:00  --------------------------------------------------------  IN:    lactated ringers.: 640 mL    Oral Fluid: 340 mL  Total IN: 980 mL    OUT:    Voided: 1180 mL  Total OUT: 1180 mL    Total NET: -200 mL           CAPILLARY BLOOD GLUCOSE                      Neuro: A+Ox3, frustrated by pain    Ext: b/l lower extremity strength 4+/5 GS, TA; patient was unable to follow command to accurately assess FHL, EHL  b/l lower extremities SILT/WWP      LABS   25 Jun 2018 07:03    136    |  98     |  19     ----------------------------<  111    4.3     |  27     |  0.70     Ca    9.3        25 Jun 2018 07:03                                   15.0   13.2  )-----------( 279      ( 25 Jun 2018 07:03 )             45.3             PT/INR - ( 24 Jun 2018 17:33 )   PT: 11.9 sec;   INR: 1.07          PTT - ( 24 Jun 2018 17:33 )  PTT:28.8 sec    [ ] Other Labs  [ ] None ordered            Please check or [place] when present:  •  Heart Failure:    [ ] Acute        [ ]  Acute on Chronic        [ ] Chronic         [ ] Diastolic     [ ]  Combined    •  ELVIN:     [ ] ATN        [ ]  Renal medullary necrosis       [ ]  Renal cortical necrosis                  [ ] Other pathological Lesion:  •  CKD:  [ ] Stage I   [ ] Stage II  [ ] Stage III    [ ]Stage IV   [ ]  CKD V   [ ]  Other/Unspecified:    •  Abdominal Nutritional Status:   [ ] Malnutrition-See Nutrition note    [ ] Cachexia   [ ]  Other        [ ] Supplement ordered:            [ ] Morbid Obesity: BMI >=40         ASSESSMENT/PLAN: 64yo male s/p lami/disc L4-5 on 6/15, now p/w re-herniation    Plan for TLIF L4-5 by Dr. Barakat today. Pre-op exams completed. Patient medically cleared by Dr. Nuñez. Patient has been NPO since midnight.     Per Pain Management, Dilaudid regimen to be discontinued, patient to be placed on Morphine PCA.          CONTINUE:          [x] WBS - WBAT    [x] DVT PPX- SCDs    [x] Pain Mgt - Pain Management team consulted, recs appreciated.    [x] Dispo plan- Pending

## 2018-06-25 NOTE — PROGRESS NOTE ADULT - SUBJECTIVE AND OBJECTIVE BOX
Ortho Post Op Check    Procedure: TLIF L4-5  Surgeon: Roshni  Laterality:    Pt comfortable without complaints, pain controlled  Denies CP, SOB, N/V, numbness/tingling     Vital Signs Last 24 Hrs  T(C): 36.1 (06-25-18 @ 17:10), Max: 36.1 (06-25-18 @ 17:10)  T(F): 96.9 (06-25-18 @ 17:10), Max: 96.9 (06-25-18 @ 17:10)  HR: 50 (06-25-18 @ 18:25) (50 - 58)  BP: 152/65 (06-25-18 @ 18:25) (134/74 - 157/87)  BP(mean): 96 (06-25-18 @ 18:25) (96 - 111)  RR: 12 (06-25-18 @ 18:25) (12 - 18)  SpO2: 99% (06-25-18 @ 18:25) (98% - 100%)  AVSS    NAD, non labored respirations  Affected extremity:          Dressing: clean/dry/intact          Drains: 2         Sensation: [x ] intact to light touch  [ ] decreased                              Vascular: [x ] warm well perfused; capillary refill <3 seconds          Motor exam: [x ]         [ ] Upper extremity                   Aneta (c5)   Bi(c5/6)   WE(c6)   EE(c7)    (c8)                                                R           5              5            5             5             5                                               L            5              5            5             5            5         [x ] Lower extremity                   IP(L2)     Q(L3)     TA(L4)     EHL(L5)     GS(s1)                                                 R          5           5          5            5              5                                               L           5           5         5             5              5                            15.0   13.2  )-----------( 279      ( 25 Jun 2018 07:03 )             45.3   25 Jun 2018 07:03    136    |  98     |  19     ----------------------------<  111    4.3     |  27     |  0.70       TPro  7.9    /  Alb  4.4    /  TBili  0.2    /  DBili  x      /  AST  17     /  ALT  21     /  AlkPhos  62     24 Jun 2018 17:33      Post-op X-Ray: Implant in good position    A/P: 63yMale POD#0 s/p TLIF L4-5  - Stable  - Pain Control  - DVT ppx: SCDs  - Post op abx: Ancef  - PT, WBS: WBAT    Ortho Pager [phone]

## 2018-06-25 NOTE — PROGRESS NOTE ADULT - SUBJECTIVE AND OBJECTIVE BOX
Patient seen and examined at bedside.    No acute events.  Pain tolerable.     Denies chest pain/SOB.  No headache.  N PO.    T(C): 36.4 (06-25-18 @ 04:28), Max: 37.1 (06-24-18 @ 19:56)  T(F): 97.6 (06-25-18 @ 04:28), Max: 98.7 (06-24-18 @ 19:56)  HR:  (50 - 70)  BP:  (131/71 - 180/90)  RR:  (18 - 18)  SpO2:  (95% - 98%)  Wt(kg): --    NAD, non labored respirations.  Abd soft, NTND.  NVID   wwp and SILT                              15.0   13.2<H> )-------------------( 279      ( 06-25 @ 07:03 )                 45.3       I&O's Detail    24 Jun 2018 07:01  -  25 Jun 2018 07:00  --------------------------------------------------------  IN:    lactated ringers.: 640 mL    Oral Fluid: 340 mL  Total IN: 980 mL    OUT:    Voided: 1180 mL  Total OUT: 1180 mL    Total NET: -200 mL      A&P:    To OR today with DR. Barakat 6/25/18

## 2018-06-25 NOTE — DISCHARGE NOTE ADULT - PATIENT PORTAL LINK FT
You can access the [facility] Portal, offered by [facility], by registering with the following website: [email or web address]

## 2018-06-25 NOTE — DISCHARGE NOTE ADULT - ADDITIONAL INSTRUCTIONS
No strenuous activity (bending/twisting), heavy lifting, driving or returning to work until cleared by MD.    Change dressing daily with gauze/tape or medipore dressing until post-op day #5, then leave incision open to air.    You may shower post-op day#5, keep incision clean and dry.     Try to have regular bowel movements, take stool softener or laxative if necessary.  May take pepcid or zantac for upset stomach.  May apply ice to affected areas to decrease swelling.    Call to schedule an appointment with Dr. Barakat for follow up, if you have staples or sutures they will be removed in office.    Contact your doctor if you experience: fever greater than 101.5, chills, chest pain, difficulty breathing, redness or excessive drainage around the incision, other concerns.    Follow up with your primary care provider.

## 2018-06-25 NOTE — PROGRESS NOTE ADULT - SUBJECTIVE AND OBJECTIVE BOX
HPI:  63M s/p Lami/disc L4-5 on 6/15 by Dr. Barakat p/w worsening back pain. Pt underwent discectomy for HNP on 6/15 and was discharged w/o complication. However over last few days pt reported worsening back/radicular pain.  New MRI was obtained and showed a re-herniation of L4-5 disc. Denies injury/trauma, numbness/weakness, fever/chills.  Sent in for preop for TLIF L4-5 on . (2018 17:37)    PAST MEDICAL & SURGICAL HISTORY:  PTSD (post-traumatic stress disorder): from childhood trauma  S/P tendon repair: 2-4TH metacarpal TENDON REPAIR  Fracture of finger: FINGER ORIF 4TH metacarpal  H/O hernia repair  History of appendectomy      MEDICATIONS  (STANDING):    MEDICATIONS  (PRN):    No Known Allergies      Vital Signs Last 24 Hrs  T(C): 36.2 (2018 09:56), Max: 37.1 (2018 19:56)  T(F): 97.2 (2018 09:56), Max: 98.7 (2018 19:56)  HR: 58 (2018 09:56) (50 - 95)  BP: 174/88 (2018 09:56) (131/71 - 180/90)  BP(mean): --  RR: 18 (2018 09:56) (18 - 18)  SpO2: 95% (2018 09:56) (95% - 98%)    DISC DISORDER  Handoff  MEWS Score  PTSD (post-traumatic stress disorder)  No pertinent past medical history  Disc disorder  S/P tendon repair  Fracture of finger  H/O hernia repair  History of appendectomy  LEG PAIN  8      PT/INR - ( 2018 17:33 )   PT: 11.9 sec;   INR: 1.07          PTT - ( 2018 17:33 )  PTT:28.8 sec                                15.0   13.2  )-----------( 279      ( 2018 07:03 )             45.3           136  |  98  |  19  ----------------------------<  111<H>  4.3   |  27  |  0.70    Ca    9.3      2018 07:03    TPro  7.9  /  Alb  4.4  /  TBili  0.2  /  DBili  x   /  AST  17  /  ALT  21  /  AlkPhos  62  06-24      Urinalysis Basic - ( 2018 23:14 )    Color: Yellow / Appearance: Clear / S.020 / pH: x  Gluc: x / Ketone: NEGATIVE  / Bili: Negative / Urobili: 0.2 E.U./dL   Blood: x / Protein: NEGATIVE mg/dL / Nitrite: NEGATIVE   Leuk Esterase: NEGATIVE / RBC: x / WBC x   Sq Epi: x / Non Sq Epi: x / Bacteria: x        MD  consult reviewed       ROS                          + reviewed  H/P                          NO CHANGE  GENERAL                 + awake           +alert        -confused          - lethargic  CARDIOVASC          -chest pain    -palpitation         -SOB           -ZELAYA  PULMONARY          -cough        -congestion             -wheezing  ENT                           -hoarseness         -sore throat      Gastrointestinal      -nausea         -vomitting         -diarrhea          -pain                                    -incontinent       -dysuria         -frequency       -retention      MS                              -weakness      PSYCH                        +awake           -agitation         -dementia    -delerium  SKIN                            - dry              -rash           -decubitus    PHYSICAL  EXAMINATION     General: Well developed; well nourished; in no acute distress  Eyes: PERRL, EOM intact; conjunctiva and sclera clear  Head: Normocephalic; atraumatic  Neck: Supple; non tender; no masses  Respiratory: No wheezes, rales or rhonchi  Cardiovascular: Regular rate and rhythm. S1 and S2 Normal; No murmurs, gallops or rubs  Gastrointestinal: Soft non-tender non-distended; Normal bowel sounds; No hepatosplenomegaly  Extremities:  No clubbing, cyanosis or edema            Peripheral pulses palpable 2+ bilaterally  Neurological: Alert and oriented x 3, CN 2-12 grossly intact

## 2018-06-25 NOTE — DISCHARGE NOTE ADULT - MEDICATION SUMMARY - MEDICATIONS TO STOP TAKING
I will STOP taking the medications listed below when I get home from the hospital:    oxyCODONE-acetaminophen 5 mg-325 mg oral tablet  -- 1 tab(s) by mouth 4 times a day, As Needed MDD:4 for Severe Pain  -- Caution federal law prohibits the transfer of this drug to any person other  than the person for whom it was prescribed.  May cause drowsiness.  Alcohol may intensify this effect.  Use care when operating dangerous machinery.  This prescription cannot be refilled.  This product contains acetaminophen.  Do not use  with any other product containing acetaminophen to prevent possible liver damage.  Using more of this medication than prescribed may cause serious breathing problems.

## 2018-06-25 NOTE — DISCHARGE NOTE ADULT - CARE PROVIDER_API CALL
Justice Barakat (MD), Orthopaedic Surgery  [address]  [address]  [place], [place] [address]  Phone: [phone]  Fax: [phone]

## 2018-06-25 NOTE — DISCHARGE NOTE ADULT - HOSPITAL COURSE
Admitted 6/24/18  Surgery underwent TLIF L4-L5 on 6/25/18  Jania-op Antibiotics  Pain control, Pain Management Team consulted  DVT prophylaxis  OOB/Physical Therapy Admitted 6/24/18  Surgery underwent TLIF L4-L5 on 6/25/18  Jania-op Antibiotics  Pain control, Pain Management Team   Med consult  DVT prophylaxis  OOB/Physical Therapy

## 2018-06-25 NOTE — CONSULT NOTE ADULT - SUBJECTIVE AND OBJECTIVE BOX
Pain Management Consult Note - [facility] [phone]    Chief Complaint: LBP    HPI: Called to evaluate 63 y.o. male c/o LBP due to lumbar disc re-herniation s/p discectomy on 6/15. Patient is preop for TLIF today.       Pain is _x__ sharp ____dull ___burning __x_achy ___ Intensity: ____ mild __x_mod _x__severe     Location __x__surgical site ____cervical ____x_lumbar ____abd ____upper ext___x_lower ext    Worse with _x___activity __x__movement _____physical therapy___ Rest    Improved with ___x_medication _x__rest ____physical therapy    ROS: Const:  __-_febrile   Eyes:___ENT:___CV: ___-chest pain  Resp: __-__sob  GI:___nausea ___vomiting ___abd pain ___npo ___clears __full diet __bm  :___ Musk: __x_pain ___spasm  Skin:___ Neuro:  ___sedation___confusion___ numbness ___weakness ___paresth  Psych:__anxiety  Endo:___ Heme:___Allergy:_________, _x__all others reviewed and negative    PAST MEDICAL & SURGICAL HISTORY:  PTSD (post-traumatic stress disorder): from childhood trauma  S/P tendon repair: 2-4TH metacarpal TENDON REPAIR  Fracture of finger: FINGER ORIF 4TH metacarpal  H/O hernia repair  History of appendectomy      SH: __+_Tobacco   __-_Alcohol                          FH:FAMILY HISTORY: denies pertinent family history          T(C): 36.2 (06-25-18 @ 09:56), Max: 37.1 (06-24-18 @ 19:56)  HR: 58 (06-25-18 @ 09:56) (50 - 95)  BP: 174/88 (06-25-18 @ 09:56) (131/71 - 180/90)  RR: 18 (06-25-18 @ 09:56) (18 - 18)  SpO2: 95% (06-25-18 @ 09:56) (95% - 98%)  Wt(kg): --    T(C): 36.2 (06-25-18 @ 09:56), Max: 37.1 (06-24-18 @ 19:56)  HR: 58 (06-25-18 @ 09:56) (50 - 95)  BP: 174/88 (06-25-18 @ 09:56) (131/71 - 180/90)  RR: 18 (06-25-18 @ 09:56) (18 - 18)  SpO2: 95% (06-25-18 @ 09:56) (95% - 98%)  Wt(kg): --    T(C): 36.2 (06-25-18 @ 09:56), Max: 37.1 (06-24-18 @ 19:56)  HR: 58 (06-25-18 @ 09:56) (50 - 95)  BP: 174/88 (06-25-18 @ 09:56) (131/71 - 180/90)  RR: 18 (06-25-18 @ 09:56) (18 - 18)  SpO2: 95% (06-25-18 @ 09:56) (95% - 98%)  Wt(kg): --    PHYSICAL EXAM:  Gen Appearance: __x_no acute distress __x_appropriate        Neuro: _x__SILT feet___x_ EOM Intact Psych: AAOX_3_, __x_mood/affect appropriate        Eyes: _x_conjunctiva WNL  _____ Pupils equal and round        ENT: __x_ears and nose atraumatic__x_ Hearing grossly intact        Neck: _x__trachea midline, no visible masses ___thyroid without palpable mass    Resp: _x__Nml WOB____No tactile fremitus ___clear to auscultation    Cardio: __x_extremities free from edema _x___pedal pulses palpable    GI/Abdomen: __x_soft ___x__ Nontender____x__Nondistended_____HSM    Lymphatic: ___no palpable nodes in neck  ___no palpable nodes calves and feet    Skin/Wound: ___Incision, _x__Dressing c/d/i,   __x__surrounding tissues soft,  ___drain/chest tube present____    Muscular: EHL _5__/5  Gastrocnemius__5_/5    _x__absent clubbing/cyanosis      ASSESSMENT: This is a 63y old Male with a history of   DISC DISORDER  Handoff  MEWS Score  PTSD (post-traumatic stress disorder)  No pertinent past medical history  Disc disorder  S/P tendon repair  Fracture of finger  H/O hernia repair  History of appendectomy  LEG PAIN  8      Recommended Treatment PLAN:    1. Morphine PCA 1mg q6 minutes with 6mg q 1hr rescue bolus  Plan discussed with Dr. Aleman

## 2018-06-25 NOTE — DISCHARGE NOTE ADULT - MEDICATION SUMMARY - MEDICATIONS TO TAKE
I will START or STAY ON the medications listed below when I get home from the hospital:    Tylenol  -- 975 mg by mouth every 8 hours  -- Indication: For pain    Dilaudid 2 mg oral tablet  -- 3 tab(s) (6 mg total) by mouth every 6 hours, As Needed MDD:36 mg  -- Caution federal law prohibits the transfer of this drug to any person other  than the person for whom it was prescribed.  May cause drowsiness.  Alcohol may intensify this effect.  Use care when operating dangerous machinery.  This prescription cannot be refilled.  Using more of this medication than prescribed may cause serious breathing problems.    -- Indication: For pain    Valium 5 mg oral tablet  -- 0.5 to1 tab(s) by mouth once a day (at bedtime), As Needed -for muscle spasm MDD:one tab  -- Caution federal law prohibits the transfer of this drug to any person other  than the person for whom it was prescribed.  Do not take this drug if you are pregnant.  May cause drowsiness.  Alcohol may intensify this effect.  Use care when operating dangerous machinery.    -- Indication: For spasm    KlonoPIN 0.5 mg oral tablet  -- 1 tab(s) by mouth once a day, As Needed  -- Indication: For home med    traZODone 100 mg oral tablet  -- 1 tab(s) by mouth once a day  -- Indication: For home med    Lidoderm 5% topical film  -- Apply on skin to back once a day (12 hours on/12 hours off) MDD:1 patch  -- For external use only.  Remove old patch prior to applying a new patch.    -- Indication: For pain     docusate sodium 100 mg oral capsule  -- 1 cap(s) by mouth 3 times a day  -- Indication: For constipation

## 2018-06-26 LAB
ANION GAP SERPL CALC-SCNC: 13 MMOL/L — SIGNIFICANT CHANGE UP (ref 5–17)
BASOPHILS NFR BLD AUTO: 0.1 % — SIGNIFICANT CHANGE UP (ref 0–2)
BUN SERPL-MCNC: 16 MG/DL — SIGNIFICANT CHANGE UP (ref 7–23)
CALCIUM SERPL-MCNC: 9.2 MG/DL — SIGNIFICANT CHANGE UP (ref 8.4–10.5)
CHLORIDE SERPL-SCNC: 98 MMOL/L — SIGNIFICANT CHANGE UP (ref 96–108)
CO2 SERPL-SCNC: 28 MMOL/L — SIGNIFICANT CHANGE UP (ref 22–31)
CREAT SERPL-MCNC: 0.72 MG/DL — SIGNIFICANT CHANGE UP (ref 0.5–1.3)
EOSINOPHIL NFR BLD AUTO: 0.1 % — SIGNIFICANT CHANGE UP (ref 0–6)
GLUCOSE SERPL-MCNC: 108 MG/DL — HIGH (ref 70–99)
HCT VFR BLD CALC: 40.4 % — SIGNIFICANT CHANGE UP (ref 39–50)
HGB BLD-MCNC: 13.4 G/DL — SIGNIFICANT CHANGE UP (ref 13–17)
LYMPHOCYTES # BLD AUTO: 16.8 % — SIGNIFICANT CHANGE UP (ref 13–44)
MCHC RBC-ENTMCNC: 27.9 PG — SIGNIFICANT CHANGE UP (ref 27–34)
MCHC RBC-ENTMCNC: 33.2 G/DL — SIGNIFICANT CHANGE UP (ref 32–36)
MCV RBC AUTO: 84.2 FL — SIGNIFICANT CHANGE UP (ref 80–100)
MONOCYTES NFR BLD AUTO: 9.3 % — SIGNIFICANT CHANGE UP (ref 2–14)
NEUTROPHILS NFR BLD AUTO: 73.7 % — SIGNIFICANT CHANGE UP (ref 43–77)
PLATELET # BLD AUTO: 261 K/UL — SIGNIFICANT CHANGE UP (ref 150–400)
POTASSIUM SERPL-MCNC: 4.4 MMOL/L — SIGNIFICANT CHANGE UP (ref 3.5–5.3)
POTASSIUM SERPL-SCNC: 4.4 MMOL/L — SIGNIFICANT CHANGE UP (ref 3.5–5.3)
RBC # BLD: 4.8 M/UL — SIGNIFICANT CHANGE UP (ref 4.2–5.8)
RBC # FLD: 12.3 % — SIGNIFICANT CHANGE UP (ref 10.3–16.9)
SODIUM SERPL-SCNC: 139 MMOL/L — SIGNIFICANT CHANGE UP (ref 135–145)
WBC # BLD: 16.7 K/UL — HIGH (ref 3.8–10.5)
WBC # FLD AUTO: 16.7 K/UL — HIGH (ref 3.8–10.5)

## 2018-06-26 RX ORDER — DIAZEPAM 5 MG
5 TABLET ORAL EVERY 8 HOURS
Qty: 0 | Refills: 0 | Status: DISCONTINUED | OUTPATIENT
Start: 2018-06-26 | End: 2018-06-29

## 2018-06-26 RX ORDER — LIDOCAINE 4 G/100G
1 CREAM TOPICAL DAILY
Qty: 0 | Refills: 0 | Status: DISCONTINUED | OUTPATIENT
Start: 2018-06-26 | End: 2018-06-29

## 2018-06-26 RX ORDER — MORPHINE SULFATE 50 MG/1
30 CAPSULE, EXTENDED RELEASE ORAL
Qty: 0 | Refills: 0 | Status: DISCONTINUED | OUTPATIENT
Start: 2018-06-26 | End: 2018-06-28

## 2018-06-26 RX ORDER — SENNA PLUS 8.6 MG/1
2 TABLET ORAL AT BEDTIME
Qty: 0 | Refills: 0 | Status: DISCONTINUED | OUTPATIENT
Start: 2018-06-26 | End: 2018-06-29

## 2018-06-26 RX ADMIN — SENNA PLUS 2 TABLET(S): 8.6 TABLET ORAL at 21:53

## 2018-06-26 RX ADMIN — MORPHINE SULFATE 6 MILLIGRAM(S): 50 CAPSULE, EXTENDED RELEASE ORAL at 08:03

## 2018-06-26 RX ADMIN — MORPHINE SULFATE 6 MILLIGRAM(S): 50 CAPSULE, EXTENDED RELEASE ORAL at 05:01

## 2018-06-26 RX ADMIN — Medication 100 MILLIGRAM(S): at 05:30

## 2018-06-26 RX ADMIN — MORPHINE SULFATE 30 MILLILITER(S): 50 CAPSULE, EXTENDED RELEASE ORAL at 18:15

## 2018-06-26 RX ADMIN — Medication 100 MILLIGRAM(S): at 13:09

## 2018-06-26 RX ADMIN — Medication 650 MILLIGRAM(S): at 17:13

## 2018-06-26 RX ADMIN — Medication 5 MILLIGRAM(S): at 17:13

## 2018-06-26 RX ADMIN — Medication 100 MILLIGRAM(S): at 13:12

## 2018-06-26 RX ADMIN — Medication 5 MILLIGRAM(S): at 21:53

## 2018-06-26 RX ADMIN — MORPHINE SULFATE 6 MILLIGRAM(S): 50 CAPSULE, EXTENDED RELEASE ORAL at 10:50

## 2018-06-26 RX ADMIN — Medication 100 MILLIGRAM(S): at 05:29

## 2018-06-26 RX ADMIN — Medication 100 MILLIGRAM(S): at 21:53

## 2018-06-26 RX ADMIN — MORPHINE SULFATE 6 MILLIGRAM(S): 50 CAPSULE, EXTENDED RELEASE ORAL at 18:09

## 2018-06-26 RX ADMIN — Medication 0.5 MILLIGRAM(S): at 21:53

## 2018-06-26 NOTE — PROGRESS NOTE ADULT - SUBJECTIVE AND OBJECTIVE BOX
Pain Management Progress Note - [facility] [phone]    HPI: Patient seen at 09:20am. Patient laying down in bed, in no apparent distress. Patient complains of surgical site and lower back pain but expresses pain relief with Morphine PCA.        Pertinent PMH: Pain at: _x__Back ___Neck___Knee ___Hip ___Shoulder ___ Opioid tolerance    Pain is ___ sharp __x__dull ___burning _x__achy ___ Intensity: ____ mild __x__mod _x___severe     Location __x___surgical site _____cervical ___x__lumbar ____abd _____upper ext____lower ext    Worse with __x__activity _x___movement _____physical therapy___ Rest    Improved with _x___medication _x___rest ____physical therapy    morphine  - Injectable  lactated ringers.  acetaminophen   Tablet  HYDROmorphone  Injectable  HYDROmorphone   Tablet  magnesium hydroxide Suspension  docusate sodium  HYDROmorphone   Tablet  clonazePAM Tablet  traZODone  HYDROmorphone  Injectable  HYDROmorphone   Tablet  morphine PCA (5 mG/mL)  morphine PCA (5 mG/mL) Rescue Clinician Bolus  naloxone Injectable  ondansetron Injectable  acetaminophen   Tablet  magnesium hydroxide Suspension  docusate sodium  clonazePAM Tablet  traZODone  morphine PCA (5 mG/mL)  morphine PCA (5 mG/mL) Rescue Clinician Bolus  naloxone Injectable  ondansetron Injectable  lactated ringers.  acetaminophen   Tablet.  ceFAZolin   IVPB  diazepam    Tablet  aluminum hydroxide/magnesium hydroxide/simethicone Suspension  metoclopramide Injectable      ROS: Const:  _-__febrile   Eyes:___ENT:___CV: __-_chest pain  Resp: __-__sob  GI:_-__nausea _-__vomiting _-___abd pain ___npo ___clears _x__full diet __bm  :___ Musk: _x__pain _x__spasm  Skin:___ Neuro:  _-__nmxeofnv_-__tgkhsmars__-__ numbness __-_weakness _-__paresthesia  Psych:__-_anxiety  Endo:___ Heme:___Allergy:___   _x__ All other systems reviewed and negative    06-26 @ 07:3099 mL/min/1.73M2    Hemoglobin: 13.4 g/dL (06-26 @ 07:30)  Hemoglobin: 15.0 g/dL (06-25 @ 07:03)  Hemoglobin: 15.5 g/dL (06-24 @ 17:33)        T(C): 37.4 (06-26-18 @ 08:51), Max: 37.4 (06-26-18 @ 08:51)  HR: 81 (06-26-18 @ 10:50) (49 - 81)  BP: 136/86 (06-26-18 @ 10:50) (105/58 - 157/87)  RR: 17 (06-26-18 @ 08:51) (11 - 18)  SpO2: 97% (06-26-18 @ 08:51) (94% - 100%)  Wt(kg): --     PHYSICAL EXAM:  Gen Appearance: _x__no acute distress _x__appropriate         Neuro: _x__SILT feet____ EOM Intact Psych: AAOX_3_, _x__mood/affect appropriate        Eyes: __x_conjunctiva WNL  __x___ Pupils equal and round        ENT: __x_ears and nose atraumatic_x__ Hearing grossly intact        Neck: _x__trachea midline, no visible masses ___thyroid without palpable mass    Resp: x___Nml WOB____No tactile fremitus ___clear to auscultation    Cardio: _x__extremities free from edema __x__pedal pulses palpable    GI/Abdomen: x___soft _x__ Nontender__x____Nondistended_____HSM    Lymphatic: __x_no palpable nodes in neck  ___no palpable nodes calves and feet    Skin/Wound: _x__Incision, _x__Dressing c/d/i,   __x__surrounding tissues soft,  ___drain/chest tube present____    Muscular: EHL _5__/5  Gastrocnemius_5__/5    ___absent clubbing/cyanosis         ASSESSMENT:  This is a 63y old Male with a history of:  DISC DISORDER  PTSD (post-traumatic stress disorder)  No pertinent past medical history  Disc disorder  S/P tendon repair  Fracture of finger  H/O hernia repair  History of appendectomy  LEG PAIN          Recommended Treatment PLAN:  1. Continue Morphine PCA 1mg, Q6 minutes demand dose, 64mg 4 hour max  2. Continue Morphine 6mg Q2h IVP prn clinician rescue bolus  Plan discussed with Dr. Rachel Brennan Pain Management Progress Note - [facility] [phone]    HPI: Patient seen at 09:20am. Patient laying down in bed, in no apparent distress. Patient complains of surgical site and lower back pain but expresses pain relief with Morphine PCA.        Pertinent PMH: Pain at: _x__Back ___Neck___Knee ___Hip ___Shoulder ___ Opioid tolerance    Pain is ___ sharp __x__dull ___burning _x__achy ___ Intensity: ____ mild __x__mod _x___severe     Location __x___surgical site _____cervical ___x__lumbar ____abd _____upper ext____lower ext    Worse with __x__activity _x___movement _____physical therapy___ Rest    Improved with _x___medication _x___rest ____physical therapy    morphine  - Injectable  lactated ringers.  acetaminophen   Tablet  HYDROmorphone  Injectable  HYDROmorphone   Tablet  magnesium hydroxide Suspension  docusate sodium  HYDROmorphone   Tablet  clonazePAM Tablet  traZODone  HYDROmorphone  Injectable  HYDROmorphone   Tablet  morphine PCA (5 mG/mL)  morphine PCA (5 mG/mL) Rescue Clinician Bolus  naloxone Injectable  ondansetron Injectable  acetaminophen   Tablet  magnesium hydroxide Suspension  docusate sodium  clonazePAM Tablet  traZODone  morphine PCA (5 mG/mL)  morphine PCA (5 mG/mL) Rescue Clinician Bolus  naloxone Injectable  ondansetron Injectable  lactated ringers.  acetaminophen   Tablet.  ceFAZolin   IVPB  diazepam    Tablet  aluminum hydroxide/magnesium hydroxide/simethicone Suspension  metoclopramide Injectable      ROS: Const:  _-__febrile   Eyes:___ENT:___CV: __-_chest pain  Resp: __-__sob  GI:_-__nausea _-__vomiting _-___abd pain ___npo ___clears _x__full diet __bm  :___ Musk: _x__pain _x__spasm  Skin:___ Neuro:  _-__yenncmhh_-__tyigtgype__-__ numbness __-_weakness _-__paresthesia  Psych:__-_anxiety  Endo:___ Heme:___Allergy:___   _x__ All other systems reviewed and negative    06-26 @ 07:3099 mL/min/1.73M2    Hemoglobin: 13.4 g/dL (06-26 @ 07:30)  Hemoglobin: 15.0 g/dL (06-25 @ 07:03)  Hemoglobin: 15.5 g/dL (06-24 @ 17:33)        T(C): 37.4 (06-26-18 @ 08:51), Max: 37.4 (06-26-18 @ 08:51)  HR: 81 (06-26-18 @ 10:50) (49 - 81)  BP: 136/86 (06-26-18 @ 10:50) (105/58 - 157/87)  RR: 17 (06-26-18 @ 08:51) (11 - 18)  SpO2: 97% (06-26-18 @ 08:51) (94% - 100%)  Wt(kg): --     PHYSICAL EXAM:  Gen Appearance: _x__no acute distress _x__appropriate         Neuro: _x__SILT feet____ EOM Intact Psych: AAOX_3_, _x__mood/affect appropriate        Eyes: __x_conjunctiva WNL  __x___ Pupils equal and round        ENT: __x_ears and nose atraumatic_x__ Hearing grossly intact        Neck: _x__trachea midline, no visible masses ___thyroid without palpable mass    Resp: x___Nml WOB____No tactile fremitus ___clear to auscultation    Cardio: _x__extremities free from edema __x__pedal pulses palpable    GI/Abdomen: x___soft _x__ Nontender__x____Nondistended_____HSM    Lymphatic: __x_no palpable nodes in neck  ___no palpable nodes calves and feet    Skin/Wound: _x__Incision, _x__Dressing c/d/i,   __x__surrounding tissues soft,  ___drain/chest tube present____    Muscular: EHL _5__/5  Gastrocnemius_5__/5    ___absent clubbing/cyanosis         ASSESSMENT:  This is a 63y old Male with a history of disc disorder s/p :  DISC DISORDER  PTSD (post-traumatic stress disorder)  No pertinent past medical history  Disc disorder  S/P tendon repair  Fracture of finger  H/O hernia repair  History of appendectomy  LEG PAIN          Recommended Treatment PLAN:  1. Continue Morphine PCA 1mg, Q6 minutes demand dose, 64mg 4 hour max  2. Continue Morphine 6mg Q2h IVP prn clinician rescue bolus  Plan discussed with Dr. Rachel Brennan    Addendum:   Patient seen at 05:30pm. Patient continues to complain of pain to lower back and surgical site. Discussed plan to increase Morphine PCA Dose with patient. Patient verbalized understanding.   1. Morphine PCA 1.5mg , Q6 minute demand dose, 72mg 4 hour max  2. Morphine 6mg Q2h bolus IVP clinician rescue bolus  3. Lidocaine to affected area, Q12 hours on, 12 hours off  4. Valium 5mg PO Q8h prn for muscle spasms  Discussed plan with Dr. Rachel Brennan Pain Management Progress Note - [facility] [phone]    HPI: Patient seen at 09:20am. Patient laying down in bed, in no apparent distress. Patient complains of surgical site and lower back pain but expresses pain relief with Morphine PCA.        Pertinent PMH:   DISC DISORDER  PTSD (post-traumatic stress disorder)  No pertinent past medical history  Disc disorder  S/P tendon repair  Fracture of finger  H/O hernia repair  History of appendectomy  LEG PAIN    Pain at: _x__Back ___Neck___Knee ___Hip ___Shoulder ___ Opioid tolerance    Pain is ___ sharp __x__dull ___burning _x__achy ___ Intensity: ____ mild __x__mod _x___severe     Location __x___surgical site _____cervical ___x__lumbar ____abd _____upper ext____lower ext    Worse with __x__activity _x___movement _____physical therapy___ Rest    Improved with _x___medication _x___rest ____physical therapy    morphine  - Injectable  lactated ringers.  acetaminophen   Tablet  HYDROmorphone  Injectable  HYDROmorphone   Tablet  magnesium hydroxide Suspension  docusate sodium  HYDROmorphone   Tablet  clonazePAM Tablet  traZODone  HYDROmorphone  Injectable  HYDROmorphone   Tablet  morphine PCA (5 mG/mL)  morphine PCA (5 mG/mL) Rescue Clinician Bolus  naloxone Injectable  ondansetron Injectable  acetaminophen   Tablet  magnesium hydroxide Suspension  docusate sodium  clonazePAM Tablet  traZODone  morphine PCA (5 mG/mL)  morphine PCA (5 mG/mL) Rescue Clinician Bolus  naloxone Injectable  ondansetron Injectable  lactated ringers.  acetaminophen   Tablet.  ceFAZolin   IVPB  diazepam    Tablet  aluminum hydroxide/magnesium hydroxide/simethicone Suspension  metoclopramide Injectable      ROS: Const:  _-__febrile   Eyes:___ENT:___CV: __-_chest pain  Resp: __-__sob  GI:_-__nausea _-__vomiting _-___abd pain ___npo ___clears _x__full diet __bm  :___ Musk: _x__pain _x__spasm  Skin:___ Neuro:  _-__zcxdynlu_-__iecxukgzf__-__ numbness __-_weakness _-__paresthesia  Psych:__-_anxiety  Endo:___ Heme:___Allergy:___   _x__ All other systems reviewed and negative    06-26 @ 07:3099 mL/min/1.73M2    Hemoglobin: 13.4 g/dL (06-26 @ 07:30)  Hemoglobin: 15.0 g/dL (06-25 @ 07:03)  Hemoglobin: 15.5 g/dL (06-24 @ 17:33)        T(C): 37.4 (06-26-18 @ 08:51), Max: 37.4 (06-26-18 @ 08:51)  HR: 81 (06-26-18 @ 10:50) (49 - 81)  BP: 136/86 (06-26-18 @ 10:50) (105/58 - 157/87)  RR: 17 (06-26-18 @ 08:51) (11 - 18)  SpO2: 97% (06-26-18 @ 08:51) (94% - 100%)  Wt(kg): --     PHYSICAL EXAM:  Gen Appearance: _x__no acute distress _x__appropriate         Neuro: _x__SILT feet____ EOM Intact Psych: AAOX_3_, _x__mood/affect appropriate        Eyes: __x_conjunctiva WNL  __x___ Pupils equal and round        ENT: __x_ears and nose atraumatic_x__ Hearing grossly intact        Neck: _x__trachea midline, no visible masses ___thyroid without palpable mass    Resp: x___Nml WOB____No tactile fremitus ___clear to auscultation    Cardio: _x__extremities free from edema __x__pedal pulses palpable    GI/Abdomen: x___soft _x__ Nontender__x____Nondistended_____HSM    Lymphatic: __x_no palpable nodes in neck  ___no palpable nodes calves and feet    Skin/Wound: _x__Incision, _x__Dressing c/d/i,   __x__surrounding tissues soft,  ___drain/chest tube present____    Muscular: EHL _5__/5  Gastrocnemius_5__/5    ___absent clubbing/cyanosis         ASSESSMENT:  This is a 63y old Male with a history of disc disorder s/p TLIF L4-L5, unstable under current pain medication regimen. :      Recommended Treatment PLAN:  1. Continue Morphine PCA 1mg, Q6 minutes demand dose, 64mg 4 hour max  2. Continue Morphine 6mg Q2h IVP prn clinician rescue bolus  Plan discussed with Dr. Rachel Brennan    Addendum:   Patient seen at 05:30pm. Patient continues to complain of pain to lower back and surgical site. Discussed plan to increase Morphine PCA Dose with patient. Patient verbalized understanding.   1. Morphine PCA 1.5mg , Q6 minute demand dose, 72mg 4 hour max  2. Morphine 6mg Q2h bolus IVP clinician rescue bolus  3. Lidocaine to affected area, Q12 hours on, 12 hours off  4. Valium 5mg PO Q8h prn for muscle spasms  Discussed plan with Dr. Rachel Brennan
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
## 2018-06-26 NOTE — PROGRESS NOTE ADULT - SUBJECTIVE AND OBJECTIVE BOX
Patient seen and examined at bedside.     SUBJECTIVE: No acute events overnight.  Pain tolerable.    Denies Chest Pain/SOB.    Denies Headache.    Denies Nausea/vomiting.      OBJECTIVE:   T(C): 37.2 (06-26-18 @ 04:23), Max: 37.2 (06-26-18 @ 04:23)  T(F): 99 (06-26-18 @ 04:23), Max: 99 (06-26-18 @ 04:23)  HR: 53 (06-26-18 @ 04:23) (49 - 62)  BP: 129/71 (06-26-18 @ 04:23) (105/58 - 174/88)  RR:  (11 - 18)  SpO2:  (94% - 100%)  Wt(kg): --    NAD, non labored respirations  Affected extremity:          Dressing: clean/dry/intact          Drains: 2         Sensation: [x ] intact to light touch  [ ] decreased                              Vascular: [x ] warm well perfused; capillary refill <3 seconds          Motor exam: [ x]         [ ] Upper extremity                   Aneta (c5)   Bi(c5/6)   WE(c6)   EE(c7)    (c8)                                                R           5              5            5             5             5                                               L            5              5            5             5            5         [x ] Lower extremity                   IP(L2)     Q(L3)     TA(L4)     EHL(L5)     GS(s1)                                                 R          5           5          5            5              5                                               L           5           5         5             5              5                                     15.0   13.2<H> )-------------------( 279      ( 06-25 @ 07:03 )                 45.3     I&O's Detail    24 Jun 2018 07:01  -  25 Jun 2018 07:00  --------------------------------------------------------  IN:    lactated ringers.: 640 mL    Oral Fluid: 340 mL  Total IN: 980 mL    OUT:    Voided: 1180 mL  Total OUT: 1180 mL    Total NET: -200 mL      25 Jun 2018 07:01  -  26 Jun 2018 05:47  --------------------------------------------------------  IN:    lactated ringers.: 1000 mL    lactated ringers.: 250 mL  Total IN: 1250 mL    OUT:    Accordian: 150 mL    Accordian: 20 mL    Estimated Blood Loss: 100 mL    Indwelling Catheter - Urethral: 380 mL    Voided: 800 mL  Total OUT: 1450 mL    Total NET: -200 mL          A/P :  63y Male s/p Lumbar Laminectomy/PSF/TLIF L4-5      -    Pain control + bowel regimen  -    DVT ppx: SCDs    -    Periop abx    -    ADAT  -    Check AM labs  -    Weight bearing status:   WBAT        -    Physical Therapy  -    Resume home meds  -    Dispo planning

## 2018-06-26 NOTE — PROGRESS NOTE ADULT - SUBJECTIVE AND OBJECTIVE BOX
ORTHO NOTE    [X ] Pt seen/examined at 8 AM  [ ] Pt without any complaints/in NAD.    [X ] Pt complains of:  incisional pain - PCA is helpful.  Legs feel okay.  Walked with PT.        ROS: [ ] Fever  [ ] Chills  [ ] CP [ ] SOB [ ] Dysnea  [ ] Palpitations [ ] Cough [ ] N/V/C/D [ ] Paresthia [ ] Other     [X ] ROS  otherwise negative    .    PHYSICAL EXAM:    Vital Signs Last 24 Hrs  T(C): 38 (26 Jun 2018 14:37), Max: 38 (26 Jun 2018 14:37)  T(F): 100.4 (26 Jun 2018 14:37), Max: 100.4 (26 Jun 2018 14:37)  HR: 93 (26 Jun 2018 14:37) (49 - 93)  BP: 118/70 (26 Jun 2018 14:37) (105/58 - 157/87)  BP(mean): 93 (25 Jun 2018 21:00) (93 - 111)  RR: 14 (26 Jun 2018 14:37) (11 - 18)  SpO2: 93% (26 Jun 2018 14:37) (93% - 100%)    I&O's Detail    25 Jun 2018 07:01  -  26 Jun 2018 07:00  --------------------------------------------------------  IN:    lactated ringers.: 1000 mL    lactated ringers.: 250 mL  Total IN: 1250 mL    OUT:    Accordian: 150 mL    Accordian: 20 mL    Estimated Blood Loss: 100 mL    Indwelling Catheter - Urethral: 380 mL    Voided: 800 mL  Total OUT: 1450 mL    Total NET: -200 mL      26 Jun 2018 07:01  -  26 Jun 2018 15:10  --------------------------------------------------------  IN:    Oral Fluid: 1120 mL  Total IN: 1120 mL    OUT:    Accordian: 50 mL    Accordian: 25 mL    Voided: 900 mL  Total OUT: 975 mL    Total NET: 145 mL           CAPILLARY BLOOD GLUCOSE                      Neuro:  NAD A and O x 3    Lungs:    CV:    ABD: soft n/t n/d    Ext:  TA/GS/EHL/FHL 5/5 silt b/l    2 HV in place    LABS                        13.4   16.7  )-----------( 261      ( 26 Jun 2018 07:30 )             40.4                              PT/INR - ( 24 Jun 2018 17:33 )   PT: 11.9 sec;   INR: 1.07          PTT - ( 24 Jun 2018 17:33 )  PTT:28.8 sec  06-26    139  |  98  |  16  ----------------------------<  108<H>  4.4   |  28  |  0.72    Ca    9.2      26 Jun 2018 07:30    TPro  7.9  /  Alb  4.4  /  TBili  0.2  /  DBili  x   /  AST  17  /  ALT  21  /  AlkPhos  62  06-24      [ ] Other Labs  [ ] None ordered            Please check or [place] when present:  •  Heart Failure:    [ ] Acute        [ ]  Acute on Chronic        [ ] Chronic         [ ] Diastolic     [ ]  Combined    •  ELVIN:     [ ] ATN        [ ]  Renal medullary necrosis       [ ]  Renal cortical necrosis                  [ ] Other pathological Lesion:  •  CKD:  [ ] Stage I   [ ] Stage II  [ ] Stage III    [ ]Stage IV   [ ]  CKD V   [ ]  Other/Unspecified:    •  Abdominal Nutritional Status:   [ ] Malnutrition-See Nutrition note    [ ] Cachexia   [ ]  Other        [ ] Supplement ordered:            [ ] Morbid Obesity: BMI >=40         ASSESSMENT/PLAN:      STATUS POST: TLIF L4 to L5 pod 1    CONTINUE:          [ ] PT wbat    [ ] DVT PPX- scd    [ ] Pain Mgt service is following, con't PCA until tomorrow    [ ] Dispo plan- home    Con't 2 HVs.  Passed his void trial.  Increased bowel regimen.  IS use.

## 2018-06-26 NOTE — PROGRESS NOTE ADULT - SUBJECTIVE AND OBJECTIVE BOX
Did well overnight except with expected surgical/incisional discomfort, described as manageable with the PCA.    No lower extremity symptoms. tolerating diet;Voiding without difficulty after having the mayberry removed earlier; using incentive spirometry  PE: motor 5/5   sensory intact  no calf tenderness   SCD';s on and in place  drain output noted  imp;: doing well postop revision laminotomy/fusion/implants L4-5 for recurrent disc displacement  continue drains  incentive spirometry  Cleared for out of bed with PT this AM  follow labs (inc wbc noted, likely stress related and patient was on medrol preop)  SCD/s/ incentive spirometry  Pain management input appreciated.

## 2018-06-26 NOTE — PROGRESS NOTE ADULT - SUBJECTIVE AND OBJECTIVE BOX
HPI:  63M POD #1 with less pain this morning, has not yet been out of bed.   +urinated overnight, mayberry removed. Nausea resolved  -flatus  +Dysphagia, +hoarseness    PAST MEDICAL & SURGICAL HISTORY:  PTSD (post-traumatic stress disorder): from childhood trauma  S/P tendon repair: 2-4TH metacarpal TENDON REPAIR  Fracture of finger: FINGER ORIF 4TH metacarpal  H/O hernia repair  History of appendectomy      MEDICATIONS  (STANDING):  clonazePAM Tablet 0.5 milliGRAM(s) Oral at bedtime  docusate sodium 100 milliGRAM(s) Oral three times a day  lactated ringers. 1000 milliLiter(s) (125 mL/Hr) IV Continuous <Continuous>  morphine PCA (5 mG/mL) 30 milliLiter(s) PCA Continuous PCA Continuous  traZODone 100 milliGRAM(s) Oral at bedtime    MEDICATIONS  (PRN):  acetaminophen   Tablet 650 milliGRAM(s) Oral every 6 hours PRN For Temp greater than 38 C (100.4 F)  acetaminophen   Tablet. 650 milliGRAM(s) Oral every 6 hours PRN Mild Pain (1 - 3); headache  aluminum hydroxide/magnesium hydroxide/simethicone Suspension 30 milliLiter(s) Oral every 12 hours PRN Indigestion  diazepam    Tablet 5 milliGRAM(s) Oral every 8 hours PRN muscle spasm  magnesium hydroxide Suspension 30 milliLiter(s) Oral daily PRN Constipation  metoclopramide Injectable 10 milliGRAM(s) IV Push every 6 hours PRN Nausea and/or Vomiting  morphine PCA (5 mG/mL) Rescue Clinician Bolus 6 milliGRAM(s) IV Push every 2 hours PRN Severe Pain (7 - 10)  naloxone Injectable 0.1 milliGRAM(s) IV Push every 3 minutes PRN For ANY of the following changes in patient status:  A. RR LESS THAN 10 breaths per minute, B. Oxygen saturation LESS THAN 90%, C. Sedation score of 6  ondansetron Injectable 4 milliGRAM(s) IV Push every 6 hours PRN Nausea    No Known Allergies      Vital Signs Last 24 Hrs  T(C): 37.2 (2018 04:23), Max: 37.2 (2018 04:23)  T(F): 99 (2018 04:23), Max: 99 (2018 04:23)  HR: 53 (2018 04:23) (49 - 62)  BP: 129/71 (2018 04:23) (105/58 - 174/88)  BP(mean): 93 (2018 21:00) (93 - 111)  RR: 18 (2018 04:23) (11 - 18)  SpO2: 94% (2018 04:23) (94% - 100%)    DISC DISORDER  Handoff  MEWS Score  PTSD (post-traumatic stress disorder)  No pertinent past medical history  Disc disorder  S/P tendon repair  Fracture of finger  H/O hernia repair  History of appendectomy  LEG PAIN  8      PT/INR - ( 2018 17:33 )   PT: 11.9 sec;   INR: 1.07          PTT - ( 2018 17:33 )  PTT:28.8 sec                    15.0   13.2  )-----------( 279      ( 2018 07:03 )             45.3       06    136  |  98  |  19  ----------------------------<  111<H>  4.3   |  27  |  0.70    Ca    9.3      2018 07:03    TPro  7.9  /  Alb  4.4  /  TBili  0.2  /  DBili  x   /  AST  17  /  ALT  21  /  AlkPhos  62  06-24      Urinalysis Basic - ( 2018 23:14 )    Color: Yellow / Appearance: Clear / S.020 / pH: x  Gluc: x / Ketone: NEGATIVE  / Bili: Negative / Urobili: 0.2 E.U./dL   Blood: x / Protein: NEGATIVE mg/dL / Nitrite: NEGATIVE   Leuk Esterase: NEGATIVE / RBC: x / WBC x   Sq Epi: x / Non Sq Epi: x / Bacteria: x        MD  consult reviewed       ROS                          + reviewed  H/P                          NO CHANGE  GENERAL                 + awake           +alert        -confused          - lethargic  CARDIOVASC          -chest pain    -palpitation         -SOB           -ZELAYA  PULMONARY          -cough        -congestion             -wheezing  ENT                           -hoarseness         -sore throat      Gastrointestinal      -nausea         -vomitting         -diarrhea          -pain                                    -incontinent       -dysuria         -frequency       -retention      MS                              -weakness      PSYCH                        +awake           -agitation         -dementia    -delerium  SKIN                            - dry              -rash           -decubitus    PHYSICAL  EXAMINATION     General: Well developed; well nourished; in no acute distress  Eyes: PERRL, EOM intact; conjunctiva and sclera clear  Head: Normocephalic; atraumatic  Neck: Supple; non tender; no masses  Respiratory: No wheezes, rales or rhonchi  Cardiovascular: Regular rate and rhythm. S1 and S2 Normal; No murmurs, gallops or rubs  Gastrointestinal: Soft non-tender non-distended; Normal bowel sounds; No hepatosplenomegaly  Extremities:  No clubbing, cyanosis or edema            Peripheral pulses palpable 2+ bilaterally  Neurological: Alert and oriented x 3, CN 2-12 grossly intact

## 2018-06-26 NOTE — PHYSICAL THERAPY INITIAL EVALUATION ADULT - CRITERIA FOR SKILLED THERAPEUTIC INTERVENTIONS
rehab potential/anticipated equipment needs at discharge/anticipated discharge recommendation/therapy frequency/impairments found

## 2018-06-27 LAB
ANION GAP SERPL CALC-SCNC: 12 MMOL/L — SIGNIFICANT CHANGE UP (ref 5–17)
BASOPHILS NFR BLD AUTO: 0.2 % — SIGNIFICANT CHANGE UP (ref 0–2)
BUN SERPL-MCNC: 16 MG/DL — SIGNIFICANT CHANGE UP (ref 7–23)
CALCIUM SERPL-MCNC: 8.8 MG/DL — SIGNIFICANT CHANGE UP (ref 8.4–10.5)
CHLORIDE SERPL-SCNC: 96 MMOL/L — SIGNIFICANT CHANGE UP (ref 96–108)
CO2 SERPL-SCNC: 27 MMOL/L — SIGNIFICANT CHANGE UP (ref 22–31)
CREAT SERPL-MCNC: 0.72 MG/DL — SIGNIFICANT CHANGE UP (ref 0.5–1.3)
EOSINOPHIL NFR BLD AUTO: 1.2 % — SIGNIFICANT CHANGE UP (ref 0–6)
GLUCOSE SERPL-MCNC: 110 MG/DL — HIGH (ref 70–99)
HCT VFR BLD CALC: 38.1 % — LOW (ref 39–50)
HGB BLD-MCNC: 12.5 G/DL — LOW (ref 13–17)
LYMPHOCYTES # BLD AUTO: 12.7 % — LOW (ref 13–44)
MCHC RBC-ENTMCNC: 28.3 PG — SIGNIFICANT CHANGE UP (ref 27–34)
MCHC RBC-ENTMCNC: 32.8 G/DL — SIGNIFICANT CHANGE UP (ref 32–36)
MCV RBC AUTO: 86.4 FL — SIGNIFICANT CHANGE UP (ref 80–100)
MONOCYTES NFR BLD AUTO: 10.5 % — SIGNIFICANT CHANGE UP (ref 2–14)
NEUTROPHILS NFR BLD AUTO: 75.4 % — SIGNIFICANT CHANGE UP (ref 43–77)
PLATELET # BLD AUTO: 231 K/UL — SIGNIFICANT CHANGE UP (ref 150–400)
POTASSIUM SERPL-MCNC: 3.7 MMOL/L — SIGNIFICANT CHANGE UP (ref 3.5–5.3)
POTASSIUM SERPL-SCNC: 3.7 MMOL/L — SIGNIFICANT CHANGE UP (ref 3.5–5.3)
RBC # BLD: 4.41 M/UL — SIGNIFICANT CHANGE UP (ref 4.2–5.8)
RBC # FLD: 12.1 % — SIGNIFICANT CHANGE UP (ref 10.3–16.9)
SODIUM SERPL-SCNC: 135 MMOL/L — SIGNIFICANT CHANGE UP (ref 135–145)
WBC # BLD: 19.8 K/UL — HIGH (ref 3.8–10.5)
WBC # FLD AUTO: 19.8 K/UL — HIGH (ref 3.8–10.5)

## 2018-06-27 RX ADMIN — MORPHINE SULFATE 6 MILLIGRAM(S): 50 CAPSULE, EXTENDED RELEASE ORAL at 18:46

## 2018-06-27 RX ADMIN — Medication 100 MILLIGRAM(S): at 22:03

## 2018-06-27 RX ADMIN — Medication 100 MILLIGRAM(S): at 13:40

## 2018-06-27 RX ADMIN — Medication 0.5 MILLIGRAM(S): at 22:03

## 2018-06-27 RX ADMIN — Medication 100 MILLIGRAM(S): at 06:25

## 2018-06-27 RX ADMIN — LIDOCAINE 1 PATCH: 4 CREAM TOPICAL at 13:39

## 2018-06-27 RX ADMIN — Medication 5 MILLIGRAM(S): at 21:08

## 2018-06-27 RX ADMIN — Medication 5 MILLIGRAM(S): at 18:40

## 2018-06-27 RX ADMIN — MORPHINE SULFATE 6 MILLIGRAM(S): 50 CAPSULE, EXTENDED RELEASE ORAL at 04:18

## 2018-06-27 RX ADMIN — SENNA PLUS 2 TABLET(S): 8.6 TABLET ORAL at 21:08

## 2018-06-27 RX ADMIN — MORPHINE SULFATE 6 MILLIGRAM(S): 50 CAPSULE, EXTENDED RELEASE ORAL at 06:26

## 2018-06-27 RX ADMIN — MORPHINE SULFATE 30 MILLILITER(S): 50 CAPSULE, EXTENDED RELEASE ORAL at 11:39

## 2018-06-27 RX ADMIN — Medication 5 MILLIGRAM(S): at 06:25

## 2018-06-27 RX ADMIN — Medication 100 MILLIGRAM(S): at 21:07

## 2018-06-27 RX ADMIN — Medication 650 MILLIGRAM(S): at 10:28

## 2018-06-27 RX ADMIN — MORPHINE SULFATE 6 MILLIGRAM(S): 50 CAPSULE, EXTENDED RELEASE ORAL at 02:09

## 2018-06-27 NOTE — PROGRESS NOTE ADULT - SUBJECTIVE AND OBJECTIVE BOX
HPI:  Patient with no new complaints. Temp of 101 overnight with wbc ct of 19.  No dysuria, chills or localizing symptoms of infection    +ambulated short distance with PT yesterday  Tolerating clears  Good uop    PAST MEDICAL & SURGICAL HISTORY:  PTSD (post-traumatic stress disorder): from childhood trauma  S/P tendon repair: 2-4TH metacarpal TENDON REPAIR  Fracture of finger: FINGER ORIF 4TH metacarpal  H/O hernia repair  History of appendectomy      MEDICATIONS  (STANDING):  bisacodyl 5 milliGRAM(s) Oral every 12 hours  clonazePAM Tablet 0.5 milliGRAM(s) Oral at bedtime  diazepam    Tablet 5 milliGRAM(s) Oral every 8 hours  docusate sodium 100 milliGRAM(s) Oral three times a day  lactated ringers. 1000 milliLiter(s) (125 mL/Hr) IV Continuous <Continuous>  lidocaine   Patch 1 Patch Transdermal daily  morphine PCA (5 mG/mL) 30 milliLiter(s) PCA Continuous PCA Continuous  senna 2 Tablet(s) Oral at bedtime  traZODone 100 milliGRAM(s) Oral at bedtime    MEDICATIONS  (PRN):  acetaminophen   Tablet 650 milliGRAM(s) Oral every 6 hours PRN For Temp greater than 38 C (100.4 F)  acetaminophen   Tablet. 650 milliGRAM(s) Oral every 6 hours PRN Mild Pain (1 - 3); headache  aluminum hydroxide/magnesium hydroxide/simethicone Suspension 30 milliLiter(s) Oral every 12 hours PRN Indigestion  diazepam    Tablet 5 milliGRAM(s) Oral every 8 hours PRN muscle spasm  magnesium hydroxide Suspension 30 milliLiter(s) Oral daily PRN Constipation  metoclopramide Injectable 10 milliGRAM(s) IV Push every 6 hours PRN Nausea and/or Vomiting  morphine PCA (5 mG/mL) Rescue Clinician Bolus 6 milliGRAM(s) IV Push every 2 hours PRN Severe Pain (7 - 10)  naloxone Injectable 0.1 milliGRAM(s) IV Push every 3 minutes PRN For ANY of the following changes in patient status:  A. RR LESS THAN 10 breaths per minute, B. Oxygen saturation LESS THAN 90%, C. Sedation score of 6  ondansetron Injectable 4 milliGRAM(s) IV Push every 6 hours PRN Nausea    No Known Allergies      Vital Signs Last 24 Hrs  T(C): 37.4 (27 Jun 2018 04:02), Max: 38.3 (26 Jun 2018 16:58)  T(F): 99.4 (27 Jun 2018 04:02), Max: 101 (26 Jun 2018 16:58)  HR: 84 (27 Jun 2018 04:02) (60 - 93)  BP: 130/84 (27 Jun 2018 04:02) (110/68 - 136/86)  BP(mean): --  RR: 16 (27 Jun 2018 04:02) (14 - 17)  SpO2: 100% (27 Jun 2018 00:56) (93% - 100%)    DISC DISORDER  Handoff  MEWS Score  PTSD (post-traumatic stress disorder)  No pertinent past medical history  Disc disorder  S/P tendon repair  Fracture of finger  H/O hernia repair  History of appendectomy  LEG PAIN  8                 12.5   19.8  )-----------( 231      ( 27 Jun 2018 06:59 )             38.1       06-26    139  |  98  |  16  ----------------------------<  108<H>  4.4   |  28  |  0.72    Ca    9.2      26 Jun 2018 07:30            MD  consult reviewed       ROS                          + reviewed  H/P                          NO CHANGE  GENERAL                 + awake           +alert        -confused          - lethargic  CARDIOVASC          -chest pain    -palpitation         -SOB           -ZELAYA  PULMONARY          -cough        -congestion             -wheezing  ENT                           +hoarseness         -sore throat      Gastrointestinal      -nausea         -vomitting         -diarrhea          -pain                                    -incontinent       -dysuria         -frequency       -retention      MS                              -weakness      PSYCH                        +awake           -agitation         -dementia    -delerium  SKIN                            - dry              -rash           -decubitus    PHYSICAL  EXAMINATION     General: Well developed; well nourished; in no acute distress  Eyes: PERRL, EOM intact; conjunctiva and sclera clear  Head: Normocephalic; atraumatic  Neck: Supple; non tender; no masses  Respiratory: No wheezes, rales or rhonchi  Cardiovascular: Regular rate and rhythm. S1 and S2 Normal; No murmurs, gallops or rubs  Gastrointestinal: Soft non-tender non-distended; Normal bowel sounds; No hepatosplenomegaly  Extremities:  No clubbing, cyanosis or edema, decreased pain L leg            Peripheral pulses palpable 2+ bilaterally  Neurological: Alert and oriented x 3, CN 2-12 grossly intact

## 2018-06-27 NOTE — PROGRESS NOTE ADULT - SUBJECTIVE AND OBJECTIVE BOX
Pain Management Progress Note - [facility] [phone]    HPI: Patient seen at 08l;40am. Patient laying down in bed, in no apparent distress. Patient continues to complain of pain and soreness to lower back and surgical site and expresses pain relief. Discussed plan to D/c Morphine PCA and start oral pain medication regimen.. Patient expresses concern over oral pain medications addressing his pain needs and expresses need for 1 additional day of Morphine PCA use.       Pertinent PMH:   PTSD (post-traumatic stress disorder)  Disc disorder  S/P tendon repair  Fracture of finger  H/O hernia repair  History of appendectomy  LEG PAIN        Pain at: __x_Back ___Neck___Knee ___Hip ___Shoulder ___ Opioid tolerance    Pain is ___ sharp __x__dull ___burning _x__achy ___ Intensity: ____ mild __x__mod _x___severe     Location _x____surgical site _____cervical __x___lumbar ____abd _____upper ext____lower ext    Worse with _x___activity _x___movement _x____physical therapy___ Rest    Improved with __x__medication _x___rest ____physical therapy    morphine  - Injectable  lactated ringers.  acetaminophen   Tablet  HYDROmorphone  Injectable  HYDROmorphone   Tablet  magnesium hydroxide Suspension  docusate sodium  HYDROmorphone   Tablet  clonazePAM Tablet  traZODone  HYDROmorphone  Injectable  HYDROmorphone   Tablet  morphine PCA (5 mG/mL)  morphine PCA (5 mG/mL) Rescue Clinician Bolus  naloxone Injectable  ondansetron Injectable  acetaminophen   Tablet  magnesium hydroxide Suspension  docusate sodium  clonazePAM Tablet  traZODone  morphine PCA (5 mG/mL)  morphine PCA (5 mG/mL) Rescue Clinician Bolus  naloxone Injectable  ondansetron Injectable  lactated ringers.  acetaminophen   Tablet.  ceFAZolin   IVPB  diazepam    Tablet  aluminum hydroxide/magnesium hydroxide/simethicone Suspension  metoclopramide Injectable  senna  bisacodyl  morphine PCA (5 mG/mL)  lidocaine   Patch  diazepam    Tablet      ROS: Const:  _-__febrile   Eyes:___ENT:___CV: _-__chest pain  Resp: __-__sob  GI:__-_nausea _-__vomiting ___-_abd pain ___npo ___clears _x__full diet __bm  :___ Musk: _x__pain __x_spasm  Skin:___ Neuro:  _-__ziehnghu__-_nbuwvipzm___-_ numbness _x__weakness _-__paresthesia  Psych:_-__anxiety  Endo:___ Heme:___Allergy:___   _x__ All other systems reviewed and negative    06-27 @ 06:5799 mL/min/1.73M2    Hemoglobin: 12.5 g/dL (06-27 @ 06:59)  Hemoglobin: 13.4 g/dL (06-26 @ 07:30)        T(C): 37.8 (06-27-18 @ 10:26), Max: 38.3 (06-26-18 @ 16:58)  HR: 85 (06-27-18 @ 10:26) (60 - 93)  BP: 119/76 (06-27-18 @ 10:26) (110/68 - 130/84)  RR: 17 (06-27-18 @ 10:26) (14 - 17)  SpO2: 100% (06-27-18 @ 10:26) (93% - 100%)  Wt(kg): --     PHYSICAL EXAM:  Gen Appearance: _x__no acute distress _x__appropriate         Neuro: _x__SILT feet____ EOM Intact Psych: AAOX_3_, _x__mood/affect appropriate        Eyes: _x__conjunctiva WNL  _____ Pupils equal and round        ENT: _x__ears and nose atraumatic_x__ Hearing grossly intact        Neck: _x__trachea midline, no visible masses ___thyroid without palpable mass    Resp: _x__Nml WOB____No tactile fremitus ___clear to auscultation    Cardio: _x__extremities free from edema ___x_pedal pulses palpable    GI/Abdomen: _x__soft __x___ Nontender___x___Nondistended_____HSM    Lymphatic: _x__no palpable nodes in neck  ___no palpable nodes calves and feet    Skin/Wound: __x_Incision, _x__Dressing c/d/i,   _x___surrounding tissues soft,  _x__drain/chest tube present____    Muscular: EHL _5__/5  Gastrocnemius_5__/5    ___absent clubbing/cyanosis         ASSESSMENT:  This is a 63y old Male with a history of disc disorder s/p TLIF L4-L5 post op Day 2, stable under current pain medication regimen.          Recommended Treatment PLAN:  1. Continue Morphine 1.5mg Q6 minute demand dose,   2. Continue Morphine 6mg q1hr prn clinician rescue bolus  2. Continue lidocaine patch to affected area  3. Continue tylenol 975mg Po Q8h standing  Plan discussed with Dr. Rachel Brennan Pain Management Progress Note - [facility] [phone]    HPI: Patient seen at 08l;40am. Patient laying down in bed, in no apparent distress. Patient continues to complain of pain and soreness to lower back and surgical site and expresses pain relief. Discussed plan to D/c Morphine PCA and start oral pain medication regimen.. Patient expresses concern over oral pain medications addressing his pain needs and expresses need for 1 additional day of Morphine PCA use.       Pertinent PMH:   PTSD (post-traumatic stress disorder)  Disc disorder  S/P tendon repair  Fracture of finger  H/O hernia repair  History of appendectomy  LEG PAIN        Pain at: __x_Back ___Neck___Knee ___Hip ___Shoulder ___ Opioid tolerance    Pain is ___ sharp __x__dull ___burning _x__achy ___ Intensity: ____ mild __x__mod _x___severe     Location _x____surgical site _____cervical __x___lumbar ____abd _____upper ext____lower ext    Worse with _x___activity _x___movement _x____physical therapy___ Rest    Improved with __x__medication _x___rest ____physical therapy    morphine  - Injectable  lactated ringers.  acetaminophen   Tablet  HYDROmorphone  Injectable  HYDROmorphone   Tablet  magnesium hydroxide Suspension  docusate sodium  HYDROmorphone   Tablet  clonazePAM Tablet  traZODone  HYDROmorphone  Injectable  HYDROmorphone   Tablet  morphine PCA (5 mG/mL)  morphine PCA (5 mG/mL) Rescue Clinician Bolus  naloxone Injectable  ondansetron Injectable  acetaminophen   Tablet  magnesium hydroxide Suspension  docusate sodium  clonazePAM Tablet  traZODone  morphine PCA (5 mG/mL)  morphine PCA (5 mG/mL) Rescue Clinician Bolus  naloxone Injectable  ondansetron Injectable  lactated ringers.  acetaminophen   Tablet.  ceFAZolin   IVPB  diazepam    Tablet  aluminum hydroxide/magnesium hydroxide/simethicone Suspension  metoclopramide Injectable  senna  bisacodyl  morphine PCA (5 mG/mL)  lidocaine   Patch  diazepam    Tablet      ROS: Const:  _-__febrile   Eyes:___ENT:___CV: _-__chest pain  Resp: __-__sob  GI:__-_nausea _-__vomiting ___-_abd pain ___npo ___clears _x__full diet __bm  :___ Musk: _x__pain __x_spasm  Skin:___ Neuro:  _-__iivhwbbh__-_kmkuhemne___-_ numbness _x__weakness _-__paresthesia  Psych:_-__anxiety  Endo:___ Heme:___Allergy:___   _x__ All other systems reviewed and negative    06-27 @ 06:5799 mL/min/1.73M2    Hemoglobin: 12.5 g/dL (06-27 @ 06:59)  Hemoglobin: 13.4 g/dL (06-26 @ 07:30)        T(C): 37.8 (06-27-18 @ 10:26), Max: 38.3 (06-26-18 @ 16:58)  HR: 85 (06-27-18 @ 10:26) (60 - 93)  BP: 119/76 (06-27-18 @ 10:26) (110/68 - 130/84)  RR: 17 (06-27-18 @ 10:26) (14 - 17)  SpO2: 100% (06-27-18 @ 10:26) (93% - 100%)  Wt(kg): --     PHYSICAL EXAM:  Gen Appearance: _x__no acute distress _x__appropriate         Neuro: _x__SILT feet____ EOM Intact Psych: AAOX_3_, _x__mood/affect appropriate        Eyes: _x__conjunctiva WNL  _____ Pupils equal and round        ENT: _x__ears and nose atraumatic_x__ Hearing grossly intact        Neck: _x__trachea midline, no visible masses ___thyroid without palpable mass    Resp: _x__Nml WOB____No tactile fremitus ___clear to auscultation    Cardio: _x__extremities free from edema ___x_pedal pulses palpable    GI/Abdomen: _x__soft __x___ Nontender___x___Nondistended_____HSM    Lymphatic: _x__no palpable nodes in neck  ___no palpable nodes calves and feet    Skin/Wound: __x_Incision, _x__Dressing c/d/i,   _x___surrounding tissues soft,  _x__drain/chest tube present____    Muscular: EHL _5__/5  Gastrocnemius_5__/5    ___absent clubbing/cyanosis         ASSESSMENT:  This is a 63y old Male with a history of disc disorder s/p TLIF L4-L5 post op Day 2, stable under current pain medication regimen.          Recommended Treatment PLAN:  1. Continue Morphine 1.5mg Q6 minute demand dose,   2. Continue Morphine 6mg q1hr prn clinician rescue bolus  2. Continue lidocaine patch to affected area  3. Continue Valium 5mg Po Q8h prn muscle spasms  Plan discussed with Dr. Rachel Brennan

## 2018-06-27 NOTE — PROGRESS NOTE ADULT - SUBJECTIVE AND OBJECTIVE BOX
Patient seen and examined at bedside.     SUBJECTIVE: No acute events overnight.  Pain tolerable.    Denies Chest Pain/SOB.    Denies Headache.    Denies Nausea/vomiting.      OBJECTIVE:   T(C): 37.8 (06-27-18 @ 10:26), Max: 38.3 (06-26-18 @ 16:58)  T(F): 100 (06-27-18 @ 10:26), Max: 101 (06-26-18 @ 16:58)  HR: 85 (06-27-18 @ 10:26) (60 - 93)  BP: 119/76 (06-27-18 @ 10:26) (110/68 - 130/84)  RR:  (14 - 17)  SpO2:  (93% - 100%)  Wt(kg): --    NAD, non labored respirations  Affected extremity:          Dressing: clean/dry/intact          Drains: 2         Sensation: [x ] intact to light touch  [ ] decreased                              Vascular: [x ] warm well perfused; capillary refill <3 seconds          Motor exam: [x ]         [ ] Upper extremity                   Aneta (c5)   Bi(c5/6)   WE(c6)   EE(c7)    (c8)                                                R           5              5            5             5             5                                               L            5              5            5             5            5         [x ] Lower extremity                   IP(L2)     Q(L3)     TA(L4)     EHL(L5)     GS(s1)                                                 R          5           5          5            5              5                                               L           5           5         5             5              5                                     12.5<L>  19.8<H> )-------------------( 231      ( 06-27 @ 06:59 )                 38.1<L>    I&O's Detail    26 Jun 2018 07:01  -  27 Jun 2018 07:00  --------------------------------------------------------  IN:    Oral Fluid: 1540 mL  Total IN: 1540 mL    OUT:    Accordian: 72.5 mL    Accordian: 47.5 mL    Voided: 1700 mL  Total OUT: 1820 mL    Total NET: -280 mL          A/P :  63y Male s/p Lumbar Laminectomy/PSF/TLIF L5-S1     -    Pain control + bowel regimen  -    DVT ppx: SCDs    -    Periop abx    -    ADAT  -    Check AM labs  -    Weight bearing status:   WBAT        -    Physical Therapy  -    Resume home meds  -    Dispo planning

## 2018-06-27 NOTE — PROGRESS NOTE ADULT - SUBJECTIVE AND OBJECTIVE BOX
Did well with PT; still moderate low back pain.  No lower extremity symptoms! Feels strength in leg is improving with time.  voiding well. tolerating regular diet. inc WBC noted  PE dressing intact  drain output noted  motor is now 5/5 both lower extremities  sensory intact  no calf tenderness  sitting in chair  imp: POD #2  continue pain management  progressive ambulation with PT  incentive spirometer/scds  plan for possible discharge Friday if cleared by PT  Also spoke to [place] by phone with regard to above as well

## 2018-06-28 LAB
ANION GAP SERPL CALC-SCNC: 12 MMOL/L — SIGNIFICANT CHANGE UP (ref 5–17)
BASOPHILS NFR BLD AUTO: 0.1 % — SIGNIFICANT CHANGE UP (ref 0–2)
BUN SERPL-MCNC: 13 MG/DL — SIGNIFICANT CHANGE UP (ref 7–23)
CALCIUM SERPL-MCNC: 9.1 MG/DL — SIGNIFICANT CHANGE UP (ref 8.4–10.5)
CHLORIDE SERPL-SCNC: 99 MMOL/L — SIGNIFICANT CHANGE UP (ref 96–108)
CO2 SERPL-SCNC: 27 MMOL/L — SIGNIFICANT CHANGE UP (ref 22–31)
CREAT SERPL-MCNC: 0.63 MG/DL — SIGNIFICANT CHANGE UP (ref 0.5–1.3)
EOSINOPHIL NFR BLD AUTO: 0.6 % — SIGNIFICANT CHANGE UP (ref 0–6)
GLUCOSE SERPL-MCNC: 160 MG/DL — HIGH (ref 70–99)
HCT VFR BLD CALC: 36.6 % — LOW (ref 39–50)
HGB BLD-MCNC: 12.2 G/DL — LOW (ref 13–17)
LYMPHOCYTES # BLD AUTO: 13 % — SIGNIFICANT CHANGE UP (ref 13–44)
MCHC RBC-ENTMCNC: 28.2 PG — SIGNIFICANT CHANGE UP (ref 27–34)
MCHC RBC-ENTMCNC: 33.3 G/DL — SIGNIFICANT CHANGE UP (ref 32–36)
MCV RBC AUTO: 84.5 FL — SIGNIFICANT CHANGE UP (ref 80–100)
MONOCYTES NFR BLD AUTO: 8 % — SIGNIFICANT CHANGE UP (ref 2–14)
NEUTROPHILS NFR BLD AUTO: 78.3 % — HIGH (ref 43–77)
PLATELET # BLD AUTO: 227 K/UL — SIGNIFICANT CHANGE UP (ref 150–400)
POTASSIUM SERPL-MCNC: 3.8 MMOL/L — SIGNIFICANT CHANGE UP (ref 3.5–5.3)
POTASSIUM SERPL-SCNC: 3.8 MMOL/L — SIGNIFICANT CHANGE UP (ref 3.5–5.3)
RBC # BLD: 4.33 M/UL — SIGNIFICANT CHANGE UP (ref 4.2–5.8)
RBC # FLD: 12.3 % — SIGNIFICANT CHANGE UP (ref 10.3–16.9)
SODIUM SERPL-SCNC: 138 MMOL/L — SIGNIFICANT CHANGE UP (ref 135–145)
WBC # BLD: 18.4 K/UL — HIGH (ref 3.8–10.5)
WBC # FLD AUTO: 18.4 K/UL — HIGH (ref 3.8–10.5)

## 2018-06-28 RX ORDER — HYDROMORPHONE HYDROCHLORIDE 2 MG/ML
2 INJECTION INTRAMUSCULAR; INTRAVENOUS; SUBCUTANEOUS EVERY 4 HOURS
Qty: 0 | Refills: 0 | Status: DISCONTINUED | OUTPATIENT
Start: 2018-06-28 | End: 2018-06-29

## 2018-06-28 RX ORDER — HYDROMORPHONE HYDROCHLORIDE 2 MG/ML
0.5 INJECTION INTRAMUSCULAR; INTRAVENOUS; SUBCUTANEOUS
Qty: 0 | Refills: 0 | Status: DISCONTINUED | OUTPATIENT
Start: 2018-06-28 | End: 2018-06-29

## 2018-06-28 RX ORDER — HYDROMORPHONE HYDROCHLORIDE 2 MG/ML
4 INJECTION INTRAMUSCULAR; INTRAVENOUS; SUBCUTANEOUS EVERY 4 HOURS
Qty: 0 | Refills: 0 | Status: DISCONTINUED | OUTPATIENT
Start: 2018-06-28 | End: 2018-06-29

## 2018-06-28 RX ADMIN — Medication 5 MILLIGRAM(S): at 05:22

## 2018-06-28 RX ADMIN — MORPHINE SULFATE 30 MILLILITER(S): 50 CAPSULE, EXTENDED RELEASE ORAL at 06:57

## 2018-06-28 RX ADMIN — Medication 5 MILLIGRAM(S): at 21:44

## 2018-06-28 RX ADMIN — Medication 100 MILLIGRAM(S): at 15:07

## 2018-06-28 RX ADMIN — Medication 100 MILLIGRAM(S): at 22:40

## 2018-06-28 RX ADMIN — Medication 0.5 MILLIGRAM(S): at 22:40

## 2018-06-28 RX ADMIN — LIDOCAINE 1 PATCH: 4 CREAM TOPICAL at 15:06

## 2018-06-28 RX ADMIN — HYDROMORPHONE HYDROCHLORIDE 4 MILLIGRAM(S): 2 INJECTION INTRAMUSCULAR; INTRAVENOUS; SUBCUTANEOUS at 15:08

## 2018-06-28 RX ADMIN — LIDOCAINE 1 PATCH: 4 CREAM TOPICAL at 01:08

## 2018-06-28 RX ADMIN — Medication 10 MILLIGRAM(S): at 11:59

## 2018-06-28 RX ADMIN — HYDROMORPHONE HYDROCHLORIDE 0.5 MILLIGRAM(S): 2 INJECTION INTRAMUSCULAR; INTRAVENOUS; SUBCUTANEOUS at 17:35

## 2018-06-28 RX ADMIN — Medication 5 MILLIGRAM(S): at 03:16

## 2018-06-28 RX ADMIN — MORPHINE SULFATE 6 MILLIGRAM(S): 50 CAPSULE, EXTENDED RELEASE ORAL at 12:06

## 2018-06-28 RX ADMIN — HYDROMORPHONE HYDROCHLORIDE 4 MILLIGRAM(S): 2 INJECTION INTRAMUSCULAR; INTRAVENOUS; SUBCUTANEOUS at 21:00

## 2018-06-28 RX ADMIN — MORPHINE SULFATE 6 MILLIGRAM(S): 50 CAPSULE, EXTENDED RELEASE ORAL at 03:12

## 2018-06-28 RX ADMIN — MORPHINE SULFATE 6 MILLIGRAM(S): 50 CAPSULE, EXTENDED RELEASE ORAL at 01:05

## 2018-06-28 RX ADMIN — HYDROMORPHONE HYDROCHLORIDE 4 MILLIGRAM(S): 2 INJECTION INTRAMUSCULAR; INTRAVENOUS; SUBCUTANEOUS at 20:08

## 2018-06-28 RX ADMIN — MORPHINE SULFATE 6 MILLIGRAM(S): 50 CAPSULE, EXTENDED RELEASE ORAL at 06:17

## 2018-06-28 RX ADMIN — Medication 100 MILLIGRAM(S): at 05:22

## 2018-06-28 NOTE — PROGRESS NOTE ADULT - SUBJECTIVE AND OBJECTIVE BOX
ORTHO NOTE    [X ] Pt seen/examined.  [ ] Pt without any complaints/in NAD.    [X ] Pt complains of:  incisional pain - PCA helps but is ready to come off of it.  Legs feel fine.       ROS: [ ] Fever  [ ] Chills  [ ] CP [ ] SOB [ ] Dysnea  [ ] Palpitations [ ] Cough [ ] N/V/C/D [ ] Paresthia [ ] Other     [X ] ROS  otherwise negative    .    PHYSICAL EXAM:    Vital Signs Last 24 Hrs  T(C): 36.3 (28 Jun 2018 09:21), Max: 37.6 (27 Jun 2018 17:19)  T(F): 97.4 (28 Jun 2018 09:21), Max: 99.7 (27 Jun 2018 17:19)  HR: 84 (28 Jun 2018 09:21) (82 - 95)  BP: 143/69 (28 Jun 2018 09:21) (116/77 - 144/87)  BP(mean): --  RR: 16 (28 Jun 2018 09:21) (16 - 18)  SpO2: 95% (28 Jun 2018 09:21) (92% - 96%)    I&O's Detail    27 Jun 2018 07:01  -  28 Jun 2018 07:00  --------------------------------------------------------  IN:  Total IN: 0 mL    OUT:    Accordian: 25 mL    Accordian: 15 mL    Voided: 475 mL  Total OUT: 515 mL    Total NET: -515 mL           CAPILLARY BLOOD GLUCOSE                      Neuro:  NAD A and O x 3    Lungs:    CV:    ABD:     Ext:  TA/GS/EHL/FHL 5/5 silt b/l    back dsg c/d/i 2 HV in place  LABS                        12.2   18.4  )-----------( 227      ( 28 Jun 2018 07:32 )             36.6                                06-28    138  |  99  |  13  ----------------------------<  160<H>  3.8   |  27  |  0.63    Ca    9.1      28 Jun 2018 07:32        [ ] Other Labs  [ ] None ordered            Please check or Yocha Dehe when present:  •  Heart Failure:    [ ] Acute        [ ]  Acute on Chronic        [ ] Chronic         [ ] Diastolic     [ ]  Combined    •  ELVIN:     [ ] ATN        [ ]  Renal medullary necrosis       [ ]  Renal cortical necrosis                  [ ] Other pathological Lesion:  •  CKD:  [ ] Stage I   [ ] Stage II  [ ] Stage III    [ ]Stage IV   [ ]  CKD V   [ ]  Other/Unspecified:    •  Abdominal Nutritional Status:   [ ] Malnutrition-See Nutrition note    [ ] Cachexia   [ ]  Other        [ ] Supplement ordered:            [ ] Morbid Obesity: BMI >=40         ASSESSMENT/PLAN:      STATUS POST: TLIF L4 to L5 pod 3    CONTINUE:          [ ] PT wbat    [ ] DVT PPX- scd    [ ] Pain Mgt service is following, d/c PCA    [ ] Dispo plan- home    IS use.  Removed HVs.  Will check a UA and repeat CBC w/diff in AM.  Dr Nuñez for medicine.  Ordered dulcolax supp.

## 2018-06-28 NOTE — PROGRESS NOTE ADULT - SUBJECTIVE AND OBJECTIVE BOX
HPI:  63M s/p Lami/disc L4-5 on 6/15 - no fevers overnight. Denies significant pain complaints.   He still has a leukocytosis (18), normal UA, culture pending  Portable CXR neg  No respiratory or urinary complaints    He has ambulated but concerned he has not yet climbed up stairs and he has stairs at home.   -BM, +flatus  ROS as per chart    PAST MEDICAL & SURGICAL HISTORY:  PTSD (post-traumatic stress disorder): from childhood trauma  S/P tendon repair: 2-4TH metacarpal TENDON REPAIR  Fracture of finger: FINGER ORIF 4TH metacarpal  H/O hernia repair  History of appendectomy      MEDICATIONS  (STANDING):  bisacodyl 5 milliGRAM(s) Oral every 12 hours  clonazePAM Tablet 0.5 milliGRAM(s) Oral at bedtime  diazepam    Tablet 5 milliGRAM(s) Oral every 8 hours  docusate sodium 100 milliGRAM(s) Oral three times a day  lactated ringers. 1000 milliLiter(s) (125 mL/Hr) IV Continuous <Continuous>  lidocaine   Patch 1 Patch Transdermal daily  senna 2 Tablet(s) Oral at bedtime  traZODone 100 milliGRAM(s) Oral at bedtime    MEDICATIONS  (PRN):  acetaminophen   Tablet 650 milliGRAM(s) Oral every 6 hours PRN For Temp greater than 38 C (100.4 F)  acetaminophen   Tablet. 650 milliGRAM(s) Oral every 6 hours PRN Mild Pain (1 - 3); headache  aluminum hydroxide/magnesium hydroxide/simethicone Suspension 30 milliLiter(s) Oral every 12 hours PRN Indigestion  diazepam    Tablet 5 milliGRAM(s) Oral every 8 hours PRN muscle spasm  HYDROmorphone   Tablet 2 milliGRAM(s) Oral every 4 hours PRN Moderate Pain (4 - 6)  HYDROmorphone   Tablet 4 milliGRAM(s) Oral every 4 hours PRN Severe Pain (7 - 10)  HYDROmorphone  Injectable 0.5 milliGRAM(s) IV Push every 2 hours PRN breakthrough pain  magnesium hydroxide Suspension 30 milliLiter(s) Oral daily PRN Constipation  metoclopramide Injectable 10 milliGRAM(s) IV Push every 6 hours PRN Nausea and/or Vomiting  morphine PCA (5 mG/mL) Rescue Clinician Bolus 6 milliGRAM(s) IV Push every 2 hours PRN Severe Pain (7 - 10)  naloxone Injectable 0.1 milliGRAM(s) IV Push every 3 minutes PRN For ANY of the following changes in patient status:  A. RR LESS THAN 10 breaths per minute, B. Oxygen saturation LESS THAN 90%, C. Sedation score of 6  ondansetron Injectable 4 milliGRAM(s) IV Push every 6 hours PRN Nausea    No Known Allergies      Vital Signs Last 24 Hrs  T(C): 36.3 (28 Jun 2018 09:21), Max: 37.6 (27 Jun 2018 17:19)  T(F): 97.4 (28 Jun 2018 09:21), Max: 99.7 (27 Jun 2018 17:19)  HR: 84 (28 Jun 2018 09:21) (75 - 95)  BP: 143/69 (28 Jun 2018 09:21) (113/74 - 144/87)  BP(mean): --  RR: 16 (28 Jun 2018 09:21) (16 - 18)  SpO2: 95% (28 Jun 2018 09:21) (92% - 96%)    DISC DISORDER  Handoff  MEWS Score  PTSD (post-traumatic stress disorder)  No pertinent past medical history  Disc disorder  S/P tendon repair  Fracture of finger  H/O hernia repair  History of appendectomy  LEG PAIN  8                                      12.2   18.4  )-----------( 227      ( 28 Jun 2018 07:32 )             36.6       06-28    138  |  99  |  13  ----------------------------<  160<H>  3.8   |  27  |  0.63    Ca    9.1      28 Jun 2018 07:32            MD  consult reviewed       ROS                          + reviewed  H/P                          NO CHANGE  GENERAL                 + awake           +alert        -confused          - lethargic  CARDIOVASC          -chest pain    -palpitation         -SOB           -ZELAYA  PULMONARY          -cough        -congestion             -wheezing  ENT                           -hoarseness         -sore throat      Gastrointestinal      -nausea         -vomitting         -diarrhea          -pain                                    -incontinent       -dysuria         -frequency       -retention      MS                              -weakness      PSYCH                        +awake           -agitation         -dementia    -delerium  SKIN                            - dry              -rash           -decubitus    PHYSICAL  EXAMINATION     General: Well developed; well nourished; in no acute distress  Eyes: PERRL, EOM intact; conjunctiva and sclera clear  Head: Normocephalic; atraumatic  Neck: Supple; non tender; no masses  Respiratory: No wheezes, rales or rhonchi  Cardiovascular: Regular rate and rhythm. S1 and S2 Normal; No murmurs, gallops or rubs  Gastrointestinal: Soft mildly tender +distended; Normal bowel sounds; No hepatosplenomegaly  Extremities:  No clubbing, cyanosis or edema            Peripheral pulses palpable 2+ bilaterally  Neurological: Alert and oriented x 3, CN 2-12 grossly intact

## 2018-06-28 NOTE — PROGRESS NOTE ADULT - SUBJECTIVE AND OBJECTIVE BOX
Pain Management Progress Note - [facility] [phone]    HPI: Patient seen at 08:50am. Patient laying down in bed, in no apparent distress. Patient complains of pain to lower back and surgical site currently addresses by Morphine PCA. Discussed plan to D/c Morphine Pca and start oral pain medication regimen. Patient verbalized understanding.     Pertinent PMH:   DISC DISORDER  PTSD (post-traumatic stress disorder  Disc disorder  S/P tendon repair  Fracture of finger  H/O hernia repair  History of appendectomy  LEG PAIN        Pain at: _x__Back ___Neck___Knee ___Hip ___Shoulder ___ Opioid tolerance    Pain is ___ sharp __x__dull ___burning _x__achy ___ Intensity: ____ mild __x__mod ___x_severe     Location __x___surgical site _____cervical __x___lumbar ____abd _____upper ext____lower ext    Worse with __x__activity __x__movement ___x__physical therapy___ Rest    Improved with __x__medication _x___rest ____physical therapy    morphine  - Injectable  lactated ringers.  acetaminophen   Tablet  HYDROmorphone  Injectable  HYDROmorphone   Tablet  magnesium hydroxide Suspension  docusate sodium  HYDROmorphone   Tablet  clonazePAM Tablet  traZODone  HYDROmorphone   Tablet  morphine PCA (5 mG/mL)  morphine PCA (5 mG/mL) Rescue Clinician Bolus  naloxone Injectable  ondansetron Injectable  acetaminophen   Tablet  magnesium hydroxide Suspension  docusate sodium  clonazePAM Tablet  traZODone  morphine PCA (5 mG/mL)  morphine PCA (5 mG/mL) Rescue Clinician Bolus  naloxone Injectable  ondansetron Injectable  lactated ringers.  acetaminophen   Tablet.  ceFAZolin   IVPB  diazepam    Tablet  aluminum hydroxide/magnesium hydroxide/simethicone Suspension  metoclopramide Injectable  senna  bisacodyl  morphine PCA (5 mG/mL)  lidocaine   Patch  diazepam    Tablet  bisacodyl Suppository  HYDROmorphone  Injectable      ROS: Const:  _-__febrile   Eyes:___ENT:___CV: _-__chest pain  Resp: _-___sob  GI:__-_nausea _-__vomiting __-__abd pain ___npo ___clears _x__full diet __bm  :___ Musk: _x__pain _x__spasm  Skin:___ Neuro:  __-_ewybqgbx_-__kvvttcfxn__-__ numbness _x__weakness _-__paresthesia  Psych:_x__anxiety  Endo:___ Heme:___Allergy:___  __x_ All other systems reviewed and negative      06-28 @ [id number]:96037 mL/min/1.73M2    Hemoglobin: 12.2 g/dL (06-28 @ 07:32)  Hemoglobin: 12.5 g/dL (06-27 @ 06:59)    T(C): 36.3 (06-28-18 @ 09:21), Max: 37.6 (06-27-18 @ 17:19)  HR: 84 (06-28-18 @ 09:21) (75 - 95)  BP: 143/69 (06-28-18 @ 09:21) (113/74 - 144/87)  RR: 16 (06-28-18 @ 09:21) (16 - 18)  SpO2: 95% (06-28-18 @ 09:21) (92% - 96%)  Wt(kg): --     PHYSICAL EXAM:  Gen Appearance: __x_no acute distress __x_appropriate         Neuro: _x__SILT feet____ EOM Intact Psych: AAOX_3_, _x__mood/affect appropriate        Eyes: x___conjunctiva WNL  _____ Pupils equal and round        ENT: _x__ears and nose atraumatic__x_ Hearing grossly intact        Neck: _x__trachea midline, no visible masses ___thyroid without palpable mass    Resp: _x__Nml WOB____No tactile fremitus ___clear to auscultation    Cardio: x___extremities free from edema ____pedal pulses palpable    GI/Abdomen: _x__soft _x____ Nontender___x___Nondistended_____HSM    Lymphatic: _x__no palpable nodes in neck  ___no palpable nodes calves and feet    Skin/Wound: _x__Incision, _x__Dressing c/d/i,   _x___surrounding tissues soft,  __x_drain/chest tube present____    Muscular: EHL _5__/5  Gastrocnemius5___/5    ___absent clubbing/cyanosis         ASSESSMENT:  This is a 63y old Male with a history of disc disorder s/p TLIF L4-L5 post op Day 2, pain controlled under current pain medication regimen.        Recommended Treatment PLAN:  1. D/c Morphine PCA  2. Dilaudid 2-4mg PO Q4h prn for moderate to severe pain  3. Dilaudid 0.5mg Q2h IVP prn breakthrough pain  4. Continue Valium 5mg Po Q8h prn for muscle spasms  5. Continue Lidocaine patch to affected area 12 hours on , 12 hours off  Plan discussed with Dr. Rachel Brennan

## 2018-06-28 NOTE — PROGRESS NOTE ADULT - SUBJECTIVE AND OBJECTIVE BOX
Physical Medicine and Rehabilitation Progress Note:    Patient is a 63y old  Male who presents with a chief complaint of back pain (25 Jun 2018 11:13)      HPI:  63M s/p Lami/disc L4-5 on 6/15 by Dr. Barakat p/w worsening back pain. Pt underwent discectomy for HNP on 6/15 and was discharged w/o complication. However over last few days pt reported worsening back/radicular pain.  New MRI was obtained and showed a re-herniation of L4-5 disc. Denies injury/trauma, numbness/weakness, fever/chills.  Sent in for preop for TLIF L4-5 on Monday 6/25. (24 Jun 2018 17:37)                            12.2   18.4  )-----------( 227      ( 28 Jun 2018 07:32 )             36.6       06-28    138  |  99  |  13  ----------------------------<  160<H>  3.8   |  27  |  0.63    Ca    9.1      28 Jun 2018 07:32      Vital Signs Last 24 Hrs  T(C): 37.9 (28 Jun 2018 14:50), Max: 37.9 (28 Jun 2018 14:50)  T(F): 100.2 (28 Jun 2018 14:50), Max: 100.2 (28 Jun 2018 14:50)  HR: 80 (28 Jun 2018 14:50) (80 - 95)  BP: 112/71 (28 Jun 2018 14:50) (112/71 - 144/87)  BP(mean): --  RR: 16 (28 Jun 2018 14:50) (16 - 18)  SpO2: 96% (28 Jun 2018 14:50) (92% - 96%)    MEDICATIONS  (STANDING):  bisacodyl 5 milliGRAM(s) Oral every 12 hours  clonazePAM Tablet 0.5 milliGRAM(s) Oral at bedtime  diazepam    Tablet 5 milliGRAM(s) Oral every 8 hours  docusate sodium 100 milliGRAM(s) Oral three times a day  lactated ringers. 1000 milliLiter(s) (125 mL/Hr) IV Continuous <Continuous>  lidocaine   Patch 1 Patch Transdermal daily  senna 2 Tablet(s) Oral at bedtime  traZODone 100 milliGRAM(s) Oral at bedtime    MEDICATIONS  (PRN):  acetaminophen   Tablet 650 milliGRAM(s) Oral every 6 hours PRN For Temp greater than 38 C (100.4 F)  acetaminophen   Tablet. 650 milliGRAM(s) Oral every 6 hours PRN Mild Pain (1 - 3); headache  aluminum hydroxide/magnesium hydroxide/simethicone Suspension 30 milliLiter(s) Oral every 12 hours PRN Indigestion  diazepam    Tablet 5 milliGRAM(s) Oral every 8 hours PRN muscle spasm  HYDROmorphone   Tablet 2 milliGRAM(s) Oral every 4 hours PRN Moderate Pain (4 - 6)  HYDROmorphone   Tablet 4 milliGRAM(s) Oral every 4 hours PRN Severe Pain (7 - 10)  HYDROmorphone  Injectable 0.5 milliGRAM(s) IV Push every 2 hours PRN breakthrough pain  magnesium hydroxide Suspension 30 milliLiter(s) Oral daily PRN Constipation  metoclopramide Injectable 10 milliGRAM(s) IV Push every 6 hours PRN Nausea and/or Vomiting  naloxone Injectable 0.1 milliGRAM(s) IV Push every 3 minutes PRN For ANY of the following changes in patient status:  A. RR LESS THAN 10 breaths per minute, B. Oxygen saturation LESS THAN 90%, C. Sedation score of 6  ondansetron Injectable 4 milliGRAM(s) IV Push every 6 hours PRN Nausea    Currently Undergoing Physical Therapy at bedside.    Functional Status Assessment:    Pain Assessment/Number Scale (0-10) Adult  Presence of Pain: complains of pain/discomfort  Pain Body Location: lumbar spine  Pain Rating (0-10): Rest: 5   Pain Rating (0-10): Activity: 8   Pain Precipitating/Aggravating Factors: movement    Therapeutic Interventions      Bed Mobility  Bed Mobility Training Symptoms Noted During/After Treatment: increased pain  Bed Mobility Training Rolling/Turning: supervision;  supervision;  bed rails  Bed Mobility Training Sit-to-Supine: supervision;  supervision;  bed rails;  head of bed elevated  Bed Mobility Training Supine-to-Sit: contact guard;  1 person assist;  bed rails;  head of bed elevated  Bed Mobility Training Limitations: pain    Sit-Stand Transfer Training  Sit-to-Stand Transfer Training Rehab Potential: good, to achieve stated therapy goals  Sit-to-Stand Transfer Training Symptoms Noted During/After Treatment: increased pain  Transfer Training Sit-to-Stand Transfer: contact guard;  1 person assist;  weight-bearing as tolerated   rolling walker  Transfer Training Stand-to-Sit Transfer: supervision;  supervision;  verbal cues;  rolling walker  Sit-to-Stand Transfer Training Transfer Safety Analysis: pain;  rolling walker    Gait Training  Gait Training Rehab Potential: good, to achieve stated therapy goals  Gait Training Symptoms Noted During/After Treatment: increased pain  Gait Training: supervision;  supervision;  rolling walker;  150 feet  Gait Analysis: 3-point gait   decreased juan alberto;  decreased step length;  narrow base of support;  pain;  150 feet;  rolling walker    Stair Training  Physical Assist/Nonphysical Assist: supervision;  verbal cues  Assistive Device: bilateral rails  Number of Stairs: 2             PM&R Impression: as above    Disposition Plan Recommendations: d/c home with  home physical therapy, home care services

## 2018-06-28 NOTE — PROGRESS NOTE ADULT - SUBJECTIVE AND OBJECTIVE BOX
primarily low back/surgical discomfort; no lower extremity symptoms.  Did well with PT yesterday. Ready to have PCA removed today.  Inc wbc=18 noted  PE:  dressing dry and intact  drain output noted  motor 5/5  sensory intact  no calf tenderness    imp: POD3  remove drains today  PT- continue progressive ambulation  pain management: switch from PCA   incentrive spirometer/SCD/s  repeat WBC in AM

## 2018-06-28 NOTE — PROGRESS NOTE ADULT - SUBJECTIVE AND OBJECTIVE BOX
Patient seen and examined at bedside.     SUBJECTIVE: No acute events overnight.  Pain tolerable.    Denies Chest Pain/SOB.    Denies Headache.    Denies Nausea/vomiting.      OBJECTIVE:   T(C): 37.4 (06-28-18 @ 01:15), Max: 37.8 (06-27-18 @ 10:26)  T(F): 99.4 (06-28-18 @ 01:15), Max: 100 (06-27-18 @ 10:26)  HR: 95 (06-28-18 @ 01:15) (75 - 95)  BP: 129/84 (06-28-18 @ 01:15) (113/74 - 144/87)  RR:  (16 - 18)  SpO2:  (94% - 100%)  Wt(kg): --    NAD, non labored respirations  Affected extremity:          Dressing: clean/dry/intact          Drains: 2         Sensation: [x ] intact to light touch  [ ] decreased                              Vascular: [x ] warm well perfused; capillary refill <3 seconds          Motor exam: [x ]         [ ] Upper extremity                   Aneta (c5)   Bi(c5/6)   WE(c6)   EE(c7)    (c8)                                                R           5              5            5             5             5                                               L            5              5            5             5            5         [x ] Lower extremity                   IP(L2)     Q(L3)     TA(L4)     EHL(L5)     GS(s1)                                                 R          5           5          5            5              5                                               L           5           5         5             5              5                                     12.5<L>  19.8<H> )-------------------( 231      ( 06-27 @ 06:59 )                 38.1<L>    I&O's Detail    26 Jun 2018 07:01  -  27 Jun 2018 07:00  --------------------------------------------------------  IN:    Oral Fluid: 1540 mL  Total IN: 1540 mL    OUT:    Accordian: 72.5 mL    Accordian: 47.5 mL    Voided: 1700 mL  Total OUT: 1820 mL    Total NET: -280 mL      27 Jun 2018 07:01  -  28 Jun 2018 05:16  --------------------------------------------------------  IN:  Total IN: 0 mL    OUT:    Accordian: 25 mL    Accordian: 15 mL  Total OUT: 40 mL    Total NET: -40 mL          A/P :  63y Male s/p Lumbar Laminectomy/PSF/TLIF L4-5      -    Pain control + bowel regimen  -    DVT ppx: SCDs    -    Periop abx    -    ADAT  -    Check AM labs  -    Weight bearing status:   WBAT        -    Physical Therapy  -    Resume home meds  -    Dispo planning

## 2018-06-29 VITALS
TEMPERATURE: 99 F | RESPIRATION RATE: 16 BRPM | HEART RATE: 80 BPM | SYSTOLIC BLOOD PRESSURE: 164 MMHG | DIASTOLIC BLOOD PRESSURE: 88 MMHG | OXYGEN SATURATION: 98 %

## 2018-06-29 LAB
APPEARANCE UR: CLEAR — SIGNIFICANT CHANGE UP
BASOPHILS NFR BLD AUTO: 0.1 % — SIGNIFICANT CHANGE UP (ref 0–2)
BILIRUB UR-MCNC: NEGATIVE — SIGNIFICANT CHANGE UP
COLOR SPEC: YELLOW — SIGNIFICANT CHANGE UP
DIFF PNL FLD: NEGATIVE — SIGNIFICANT CHANGE UP
EOSINOPHIL NFR BLD AUTO: 2.3 % — SIGNIFICANT CHANGE UP (ref 0–6)
GLUCOSE UR QL: NEGATIVE — SIGNIFICANT CHANGE UP
HCT VFR BLD CALC: 35.1 % — LOW (ref 39–50)
HGB BLD-MCNC: 11.4 G/DL — LOW (ref 13–17)
KETONES UR-MCNC: NEGATIVE — SIGNIFICANT CHANGE UP
LEUKOCYTE ESTERASE UR-ACNC: NEGATIVE — SIGNIFICANT CHANGE UP
LYMPHOCYTES # BLD AUTO: 14.7 % — SIGNIFICANT CHANGE UP (ref 13–44)
MCHC RBC-ENTMCNC: 28 PG — SIGNIFICANT CHANGE UP (ref 27–34)
MCHC RBC-ENTMCNC: 32.5 G/DL — SIGNIFICANT CHANGE UP (ref 32–36)
MCV RBC AUTO: 86.2 FL — SIGNIFICANT CHANGE UP (ref 80–100)
MONOCYTES NFR BLD AUTO: 11.8 % — SIGNIFICANT CHANGE UP (ref 2–14)
NEUTROPHILS NFR BLD AUTO: 71.1 % — SIGNIFICANT CHANGE UP (ref 43–77)
NITRITE UR-MCNC: NEGATIVE — SIGNIFICANT CHANGE UP
PH UR: 6.5 — SIGNIFICANT CHANGE UP (ref 5–8)
PLATELET # BLD AUTO: 266 K/UL — SIGNIFICANT CHANGE UP (ref 150–400)
PROT UR-MCNC: NEGATIVE MG/DL — SIGNIFICANT CHANGE UP
RBC # BLD: 4.07 M/UL — LOW (ref 4.2–5.8)
RBC # FLD: 12.2 % — SIGNIFICANT CHANGE UP (ref 10.3–16.9)
SP GR SPEC: 1.01 — SIGNIFICANT CHANGE UP (ref 1–1.03)
UROBILINOGEN FLD QL: 2 E.U./DL
WBC # BLD: 14 K/UL — HIGH (ref 3.8–10.5)
WBC # FLD AUTO: 14 K/UL — HIGH (ref 3.8–10.5)

## 2018-06-29 PROCEDURE — 85025 COMPLETE CBC W/AUTO DIFF WBC: CPT

## 2018-06-29 PROCEDURE — 80048 BASIC METABOLIC PNL TOTAL CA: CPT

## 2018-06-29 PROCEDURE — 71045 X-RAY EXAM CHEST 1 VIEW: CPT | Mod: 26

## 2018-06-29 PROCEDURE — 95940 IONM IN OPERATNG ROOM 15 MIN: CPT

## 2018-06-29 PROCEDURE — 86901 BLOOD TYPING SEROLOGIC RH(D): CPT

## 2018-06-29 PROCEDURE — 85652 RBC SED RATE AUTOMATED: CPT

## 2018-06-29 PROCEDURE — 36415 COLL VENOUS BLD VENIPUNCTURE: CPT

## 2018-06-29 PROCEDURE — 81003 URINALYSIS AUTO W/O SCOPE: CPT

## 2018-06-29 PROCEDURE — 96374 THER/PROPH/DIAG INJ IV PUSH: CPT

## 2018-06-29 PROCEDURE — 88304 TISSUE EXAM BY PATHOLOGIST: CPT

## 2018-06-29 PROCEDURE — 80053 COMPREHEN METABOLIC PANEL: CPT

## 2018-06-29 PROCEDURE — 86140 C-REACTIVE PROTEIN: CPT

## 2018-06-29 PROCEDURE — 97161 PT EVAL LOW COMPLEX 20 MIN: CPT

## 2018-06-29 PROCEDURE — 99285 EMERGENCY DEPT VISIT HI MDM: CPT | Mod: 25

## 2018-06-29 PROCEDURE — C1713: CPT

## 2018-06-29 PROCEDURE — C1889: CPT

## 2018-06-29 PROCEDURE — 97116 GAIT TRAINING THERAPY: CPT

## 2018-06-29 PROCEDURE — 76000 FLUOROSCOPY <1 HR PHYS/QHP: CPT

## 2018-06-29 PROCEDURE — 93005 ELECTROCARDIOGRAM TRACING: CPT

## 2018-06-29 PROCEDURE — 85610 PROTHROMBIN TIME: CPT

## 2018-06-29 PROCEDURE — 86900 BLOOD TYPING SEROLOGIC ABO: CPT

## 2018-06-29 PROCEDURE — 71045 X-RAY EXAM CHEST 1 VIEW: CPT

## 2018-06-29 PROCEDURE — 85730 THROMBOPLASTIN TIME PARTIAL: CPT

## 2018-06-29 PROCEDURE — 86850 RBC ANTIBODY SCREEN: CPT

## 2018-06-29 RX ORDER — HYDROMORPHONE HYDROCHLORIDE 2 MG/ML
4 INJECTION INTRAMUSCULAR; INTRAVENOUS; SUBCUTANEOUS EVERY 4 HOURS
Qty: 0 | Refills: 0 | Status: DISCONTINUED | OUTPATIENT
Start: 2018-06-29 | End: 2018-06-29

## 2018-06-29 RX ORDER — HYDROMORPHONE HYDROCHLORIDE 2 MG/ML
3 INJECTION INTRAMUSCULAR; INTRAVENOUS; SUBCUTANEOUS
Qty: 126 | Refills: 0 | OUTPATIENT
Start: 2018-06-29 | End: 2018-07-05

## 2018-06-29 RX ORDER — DIAZEPAM 5 MG
1 TABLET ORAL
Qty: 7 | Refills: 0 | OUTPATIENT
Start: 2018-06-29 | End: 2018-07-05

## 2018-06-29 RX ORDER — DOCUSATE SODIUM 100 MG
1 CAPSULE ORAL
Qty: 0 | Refills: 0 | COMMUNITY
Start: 2018-06-29

## 2018-06-29 RX ORDER — HYDROMORPHONE HYDROCHLORIDE 2 MG/ML
6 INJECTION INTRAMUSCULAR; INTRAVENOUS; SUBCUTANEOUS EVERY 4 HOURS
Qty: 0 | Refills: 0 | Status: DISCONTINUED | OUTPATIENT
Start: 2018-06-29 | End: 2018-06-29

## 2018-06-29 RX ORDER — LIDOCAINE 4 G/100G
1 CREAM TOPICAL
Qty: 7 | Refills: 0 | OUTPATIENT
Start: 2018-06-29 | End: 2018-07-05

## 2018-06-29 RX ORDER — ACETAMINOPHEN 500 MG
0 TABLET ORAL
Qty: 0 | Refills: 0 | COMMUNITY

## 2018-06-29 RX ORDER — HYDROMORPHONE HYDROCHLORIDE 2 MG/ML
1 INJECTION INTRAMUSCULAR; INTRAVENOUS; SUBCUTANEOUS
Qty: 0 | Refills: 0 | Status: DISCONTINUED | OUTPATIENT
Start: 2018-06-29 | End: 2018-06-29

## 2018-06-29 RX ADMIN — HYDROMORPHONE HYDROCHLORIDE 4 MILLIGRAM(S): 2 INJECTION INTRAMUSCULAR; INTRAVENOUS; SUBCUTANEOUS at 09:25

## 2018-06-29 RX ADMIN — Medication 100 MILLIGRAM(S): at 13:21

## 2018-06-29 RX ADMIN — HYDROMORPHONE HYDROCHLORIDE 6 MILLIGRAM(S): 2 INJECTION INTRAMUSCULAR; INTRAVENOUS; SUBCUTANEOUS at 12:19

## 2018-06-29 RX ADMIN — HYDROMORPHONE HYDROCHLORIDE 4 MILLIGRAM(S): 2 INJECTION INTRAMUSCULAR; INTRAVENOUS; SUBCUTANEOUS at 05:54

## 2018-06-29 RX ADMIN — HYDROMORPHONE HYDROCHLORIDE 0.5 MILLIGRAM(S): 2 INJECTION INTRAMUSCULAR; INTRAVENOUS; SUBCUTANEOUS at 03:18

## 2018-06-29 RX ADMIN — HYDROMORPHONE HYDROCHLORIDE 4 MILLIGRAM(S): 2 INJECTION INTRAMUSCULAR; INTRAVENOUS; SUBCUTANEOUS at 10:25

## 2018-06-29 RX ADMIN — HYDROMORPHONE HYDROCHLORIDE 6 MILLIGRAM(S): 2 INJECTION INTRAMUSCULAR; INTRAVENOUS; SUBCUTANEOUS at 16:28

## 2018-06-29 RX ADMIN — LIDOCAINE 1 PATCH: 4 CREAM TOPICAL at 12:20

## 2018-06-29 RX ADMIN — HYDROMORPHONE HYDROCHLORIDE 4 MILLIGRAM(S): 2 INJECTION INTRAMUSCULAR; INTRAVENOUS; SUBCUTANEOUS at 04:55

## 2018-06-29 RX ADMIN — Medication 5 MILLIGRAM(S): at 13:21

## 2018-06-29 RX ADMIN — Medication 5 MILLIGRAM(S): at 05:44

## 2018-06-29 RX ADMIN — LIDOCAINE 1 PATCH: 4 CREAM TOPICAL at 05:00

## 2018-06-29 RX ADMIN — HYDROMORPHONE HYDROCHLORIDE 0.5 MILLIGRAM(S): 2 INJECTION INTRAMUSCULAR; INTRAVENOUS; SUBCUTANEOUS at 03:06

## 2018-06-29 RX ADMIN — HYDROMORPHONE HYDROCHLORIDE 6 MILLIGRAM(S): 2 INJECTION INTRAMUSCULAR; INTRAVENOUS; SUBCUTANEOUS at 13:00

## 2018-06-29 RX ADMIN — HYDROMORPHONE HYDROCHLORIDE 4 MILLIGRAM(S): 2 INJECTION INTRAMUSCULAR; INTRAVENOUS; SUBCUTANEOUS at 00:54

## 2018-06-29 RX ADMIN — HYDROMORPHONE HYDROCHLORIDE 0.5 MILLIGRAM(S): 2 INJECTION INTRAMUSCULAR; INTRAVENOUS; SUBCUTANEOUS at 01:06

## 2018-06-29 RX ADMIN — HYDROMORPHONE HYDROCHLORIDE 4 MILLIGRAM(S): 2 INJECTION INTRAMUSCULAR; INTRAVENOUS; SUBCUTANEOUS at 00:03

## 2018-06-29 RX ADMIN — HYDROMORPHONE HYDROCHLORIDE 0.5 MILLIGRAM(S): 2 INJECTION INTRAMUSCULAR; INTRAVENOUS; SUBCUTANEOUS at 01:36

## 2018-06-29 NOTE — DIETITIAN INITIAL EVALUATION ADULT. - ENERGY NEEDS
Height: 5'7" Weight: 152lbs, IBW 148lbs+/-10%, %%, BMI 24  IBW used for calculations as pt >120% of IBW   Nutrient needs based on [facility] standards of care for maintenance in adults.   Needs adjusted for post-op

## 2018-06-29 NOTE — PROGRESS NOTE ADULT - SUBJECTIVE AND OBJECTIVE BOX
Patient seen and examined at bedside.     SUBJECTIVE: No acute events overnight.  Pain tolerable.    Denies Chest Pain/SOB.    Denies Headache.    Denies Nausea/vomiting.      OBJECTIVE:   T(C): 37.2 (06-29-18 @ 05:00), Max: 38.1 (06-28-18 @ 20:41)  T(F): 98.9 (06-29-18 @ 05:00), Max: 100.6 (06-28-18 @ 20:41)  HR: 85 (06-29-18 @ 05:00) (79 - 89)  BP: 150/77 (06-29-18 @ 05:00) (112/71 - 150/77)  RR:  (15 - 16)  SpO2:  (95% - 98%)  Wt(kg): --    NAD, non labored respirations  Affected extremity:          Dressing: clean/dry/intact          Drains: none         Sensation: [x ] intact to light touch  [ ] decreased                              Vascular: [x ] warm well perfused; capillary refill <3 seconds          Motor exam: [x ]         [ ] Upper extremity                   Aneta (c5)   Bi(c5/6)   WE(c6)   EE(c7)    (c8)                                                R           5              5            5             5             5                                               L            5              5            5             5            5         [ x] Lower extremity                   IP(L2)     Q(L3)     TA(L4)     EHL(L5)     GS(s1)                                                 R          5           5          5            5              5                                               L           5           5         5             5              5                                     11.4<L>  14.0<H> )-------------------( 266      ( 06-29 @ 06:07 )                 35.1<L>    I&O's Detail    28 Jun 2018 07:01  -  29 Jun 2018 07:00  --------------------------------------------------------  IN:  Total IN: 0 mL    OUT:    Voided: 900 mL  Total OUT: 900 mL    Total NET: -900 mL          A/P :  63y Male s/p Lumbar Laminectomy/PSF/TLIF L4-5       -    Pain control + bowel regimen  -    DVT ppx: SCDs    -    Periop abx    -    ADAT  -    Check AM labs  -    Weight bearing status:   WBAT        -    Physical Therapy  -    Resume home meds  -    Dispo planning

## 2018-06-29 NOTE — PROGRESS NOTE ADULT - SUBJECTIVE AND OBJECTIVE BOX
HPI:  Pt doing well but still with low grade fevers, no localizing complaints.   No dysuria or cough. WBC ct improving, down to 14  +ambulating +moo PO    ROS as per chart  PAST MEDICAL & SURGICAL HISTORY:  PTSD (post-traumatic stress disorder): from childhood trauma  S/P tendon repair: 2-4TH metacarpal TENDON REPAIR  Fracture of finger: FINGER ORIF 4TH metacarpal  H/O hernia repair  History of appendectomy      MEDICATIONS  (STANDING):  bisacodyl 5 milliGRAM(s) Oral every 12 hours  clonazePAM Tablet 0.5 milliGRAM(s) Oral at bedtime  diazepam    Tablet 5 milliGRAM(s) Oral every 8 hours  docusate sodium 100 milliGRAM(s) Oral three times a day  lactated ringers. 1000 milliLiter(s) (125 mL/Hr) IV Continuous <Continuous>  lidocaine   Patch 1 Patch Transdermal daily  senna 2 Tablet(s) Oral at bedtime  traZODone 100 milliGRAM(s) Oral at bedtime    MEDICATIONS  (PRN):  acetaminophen   Tablet 650 milliGRAM(s) Oral every 6 hours PRN For Temp greater than 38 C (100.4 F)  acetaminophen   Tablet. 650 milliGRAM(s) Oral every 6 hours PRN Mild Pain (1 - 3); headache  aluminum hydroxide/magnesium hydroxide/simethicone Suspension 30 milliLiter(s) Oral every 12 hours PRN Indigestion  diazepam    Tablet 5 milliGRAM(s) Oral every 8 hours PRN muscle spasm  HYDROmorphone   Tablet 2 milliGRAM(s) Oral every 4 hours PRN Moderate Pain (4 - 6)  HYDROmorphone   Tablet 4 milliGRAM(s) Oral every 4 hours PRN Severe Pain (7 - 10)  HYDROmorphone  Injectable 0.5 milliGRAM(s) IV Push every 2 hours PRN breakthrough pain  magnesium hydroxide Suspension 30 milliLiter(s) Oral daily PRN Constipation  metoclopramide Injectable 10 milliGRAM(s) IV Push every 6 hours PRN Nausea and/or Vomiting  naloxone Injectable 0.1 milliGRAM(s) IV Push every 3 minutes PRN For ANY of the following changes in patient status:  A. RR LESS THAN 10 breaths per minute, B. Oxygen saturation LESS THAN 90%, C. Sedation score of 6  ondansetron Injectable 4 milliGRAM(s) IV Push every 6 hours PRN Nausea    No Known Allergies      Vital Signs Last 24 Hrs  T(C): 37.9 (2018 09:10), Max: 38.1 (2018 20:41)  T(F): 100.2 (2018 09:10), Max: 100.6 (2018 20:41)  HR: 75 (2018 09:10) (75 - 89)  BP: 138/89 (2018 09:10) (112/71 - 150/77)  BP(mean): --  RR: 15 (2018 09:10) (15 - 16)  SpO2: 94% (2018 09:10) (94% - 98%)    DISC DISORDER  Handoff  MEWS Score  PTSD (post-traumatic stress disorder)  No pertinent past medical history  Disc disorder  S/P tendon repair  Fracture of finger  H/O hernia repair  History of appendectomy  LEG PAIN  8                          11.4   14.0  )-----------( 266      ( 2018 06:07 )             35.1       06-28    138  |  99  |  13  ----------------------------<  160<H>  3.8   |  27  |  0.63    Ca    9.1      2018 07:32        Urinalysis Basic - ( 2018 00:40 )    Color: Yellow / Appearance: Clear / S.010 / pH: x  Gluc: x / Ketone: NEGATIVE  / Bili: Negative / Urobili: 2.0 E.U./dL   Blood: x / Protein: NEGATIVE mg/dL / Nitrite: NEGATIVE   Leuk Esterase: NEGATIVE / RBC: x / WBC x   Sq Epi: x / Non Sq Epi: x / Bacteria: x        MD  consult reviewed       ROS                          + reviewed  H/P                          NO CHANGE  GENERAL                 + awake           +alert        -confused          - lethargic  CARDIOVASC          -chest pain    -palpitation         -SOB           -ZELAYA  PULMONARY          -cough        -congestion             -wheezing  ENT                           -hoarseness         -sore throat      Gastrointestinal      -nausea         -vomitting         -diarrhea          -pain                                    -incontinent       -dysuria         -frequency       -retention      MS                              -weakness      PSYCH                        +awake           -agitation         -dementia    -delerium  SKIN                            - dry              -rash           -decubitus    PHYSICAL  EXAMINATION     General: Well developed; well nourished; in no acute distress  Eyes: PERRL, EOM intact; conjunctiva and sclera clear  Head: Normocephalic; atraumatic  Neck: Supple; non tender; no masses  Respiratory: No wheezes, rales or rhonchi  Cardiovascular: Regular rate and rhythm. S1 and S2 Normal; No murmurs, gallops or rubs  Gastrointestinal: Soft non-tender non-distended; Normal bowel sounds; No hepatosplenomegaly  Extremities:  No clubbing, cyanosis or edema            Peripheral pulses palpable 2+ bilaterally  Neurological: Alert and oriented x 3, CN 2-12 grossly intact    Assesment/PLAN  63yMale PTSD (post-traumatic stress disorder)  No pertinent past medical history     VTE prophylaxis:     reviewed  ANTIBIOTICS :         reviewed            NONE  DISPOSITION  :             HOME

## 2018-06-29 NOTE — PROGRESS NOTE ADULT - PROVIDER SPECIALTY LIST ADULT
Internal Medicine
Neurosurgery
Orthopedics
Pain Medicine
Pain Medicine
Orthopedics
Pain Medicine
Pain Medicine
Internal Medicine
Internal Medicine
Rehab Medicine

## 2018-06-29 NOTE — DIETITIAN INITIAL EVALUATION ADULT. - OTHER INFO
62yo M s/p Lumbar Laminectomy/PSF/TLIF L4-5 POD #4. Pt seen resting in bed. Currently on a regular diet and tolerating PO. C/o constipation -trying to consume more fiber in diet. Regular menu and plant based menu were provided. High fiber & protein options were discussed. RN made aware that pt was considering calling down to kitchen to change meal order. Denies N/V. No known wt changes. NKFA or dietary restrictions. Skin: surgical incision; GI WDL per flowsheet. RD contact info provided for further questions/concerns.

## 2018-06-29 NOTE — DIETITIAN INITIAL EVALUATION ADULT. - NS AS NUTRI INTERV MEALS SNACK
General/healthful diet/Continue on regular diet. Plant based menu provided for additional high protein/high fiber options.

## 2018-06-29 NOTE — PROGRESS NOTE ADULT - ASSESSMENT
A/P :  63y Male s/p Lumbar Laminectomy/PSF/TLIF L4-5      -    DC Dilaudid PCA , PO pain meds + bowel regimen  -    DVT ppx: SCDs    -    Periop abx    -    ADAT  -    Follow up urine culture and if WBC ct does not decrease consider CXR (pa and Lateral)  -    Weight bearing status:   WBAT        -    Physical Therapy  -    Continue  home meds  -    Dispo planning depending on clinical status
A/P :  63y Male s/p Lumbar Laminectomy/PSF/TLIF L4-5   POD #2  with new fever  -    Will check CXR and Ua and culture.   -    DVT ppx: SCDs    -    Periop abx    -    pain meds prn  -    Check AM labs  -    Weight bearing status:   WBAT        -    Physical Therapy  -    Resume home meds  -    No active cardiac issues  -    Hemodynamically stable
STATUS POST: TLIF L4 to L5 pod 3
Spoke with patient and family () preoperatively and they aware I will be assisting during today's surgery (as with previous surgery) and disclosure performed.
A/P :  63y Male POD #1 s/p Lumbar Laminectomy/PSF/TLIF L4-5      -    Pain control + bowel regimen  -    DVT ppx: SCDs    -    Periop abx    -    ADAT  -    Check AM labs, follow up WBC ct   -    OOB today        -    Physical Therapy  -    Resume home meds  -    No active cardiac issues, BP has improved.   -    Hemodynamically stable
A/P :  63y Male smoker s/p Lumbar Laminectomy/PSF/TLIF L4-5 POD #4 doing well, WCT ct and fever curve improving  -    Continue PO pain meds + bowel regimen  -    DVT ppx: SCDs    -    ADAT  -    Follow up urine culture and if WBC ct does not decrease consider CXR (pa and Lateral)  -    Weight bearing status:   WBAT        -    Physical Therapy  -    Continue  home meds  -    Dispo planning depending on clinical status  -    Plan discussed with housestaff  -    No active cardiac issues

## 2018-06-29 NOTE — PROGRESS NOTE ADULT - SUBJECTIVE AND OBJECTIVE BOX
Doing much better, pain improving.   Has passed PT and stairs; had bowel movement; toelrating regular diet  PE: wound clean and dry  motor 5/5  sensory intact  no calf tenderness  imp: POD 4  cleared for discharge  pain management recomendations much appreciated  cleared to shower  call for questions, fevers chills or any wound drainage  vit d and calcium  no NSAIDS/cig smoke  fu 7-10 days

## 2018-06-29 NOTE — PROGRESS NOTE ADULT - SUBJECTIVE AND OBJECTIVE BOX
Pain Management Progress Note - [facility] [phone]    HPI: Patient seen at 09:40am. Patient laying down in bed, in no apparent distress. Patient complains of lower back pain and surgical site pain not relieved with current pain medication regimen.      Pertinent PMH:   PTSD (post-traumatic stress disorder)  No pertinent past medical history  Disc disorder  S/P tendon repair  Fracture of finger  H/O hernia repair  History of appendectomy  LEG PAIN      Pain at: _x__Back ___Neck___Knee ___Hip ___Shoulder ___ Opioid tolerance    Pain is ___ sharp __x__dull ___burning x__achy ___ Intensity: ____ mild __x__mod __x__severe     Location __x___surgical site _____cervical ___x__lumbar ____abd _____upper ext____lower ext    Worse with __x__activity __x__movement _____physical therapy___ Rest    Improved with _x___medication __x__rest ____physical therapy    morphine  - Injectable  lactated ringers.  acetaminophen   Tablet  HYDROmorphone  Injectable  HYDROmorphone   Tablet  magnesium hydroxide Suspension  docusate sodium  HYDROmorphone   Tablet  clonazePAM Tablet  traZODone  HYDROmorphone  Injectable  HYDROmorphone   Tablet  morphine PCA (5 mG/mL)  morphine PCA (5 mG/mL) Rescue Clinician Bolus  naloxone Injectable  ondansetron Injectable  acetaminophen   Tablet  magnesium hydroxide Suspension  docusate sodium  clonazePAM Tablet  traZODone  morphine PCA (5 mG/mL)  morphine PCA (5 mG/mL) Rescue Clinician Bolus  naloxone Injectable  ondansetron Injectable  lactated ringers.  acetaminophen   Tablet.  ceFAZolin   IVPB  diazepam    Tablet  aluminum hydroxide/magnesium hydroxide/simethicone Suspension  metoclopramide Injectable  senna  bisacodyl  morphine PCA (5 mG/mL)  lidocaine   Patch  diazepam    Tablet  bisacodyl Suppository  HYDROmorphone   Tablet  HYDROmorphone   Tablet  HYDROmorphone  Injectable      ROS: Const:  _-__febrile   Eyes:___ENT:___CV: _-__chest pain  Resp: __-__sob  GI:__-_nausea _-__vomiting __-__abd pain ___npo ___clears _x__full diet __bm  :___ Musk: _x__pain _x__spasm  Skin:___ Neuro:  _-__xhqqohzy_-__wnqozrfde__-__ numbness _-__weakness _-__paresthesia  Psych:_-__anxiety  Endo:___ Heme:___Allergy:___   __x__ All other systems reviewed and negative      Hemoglobin: 11.4 g/dL (06-29 @ 06:07)  Hemoglobin: 12.2 g/dL (06-28 @ 07:32)      T(C): 37.9 (06-29-18 @ 09:10), Max: 38.1 (06-28-18 @ 20:41)  HR: 75 (06-29-18 @ 09:10) (75 - 89)  BP: 138/89 (06-29-18 @ 09:10) (120/78 - 150/77)  RR: 15 (06-29-18 @ 09:10) (15 - 16)  SpO2: 94% (06-29-18 @ 09:10) (94% - 98%)  Wt(kg): --     PHYSICAL EXAM:  Gen Appearance: _x__no acute distress _x__appropriate         Neuro: _x__SILT feet____ EOM Intact Psych: AAOX_3_, _x__mood/affect appropriate        Eyes: _x__conjunctiva WNL  _____ Pupils equal and round        ENT: _x__ears and nose atraumatic__x_ Hearing grossly intact        Neck: _x__trachea midline, no visible masses ___thyroid without palpable mass    Resp: _x__Nml WOB____No tactile fremitus ___clear to auscultation    Cardio: ___extremities free from edema _x___pedal pulses palpable    GI/Abdomen: _x__soft ___x__ Nontender___x___Nondistended_____HSM    Lymphatic: ___no palpable nodes in neck  _x__no palpable nodes calves and feet    Skin/Wound: _x__Incision, _x__Dressing c/d/i,   __x__surrounding tissues soft,  ___drain/chest tube present____    Muscular: EHL _5__/5  Gastrocnemius_5__/5    ___absent clubbing/cyanosis         ASSESSMENT:  This is a 63y old Male with a history of disc disorder s/p TLIF L4-L5 post op day 3 pain not well controlled under current pain medication regimen :    Recommended Treatment PLAN:  1. Dilaudid 4-6mg PO Q4h prn for moderate to severe pain  2. Dilaudid 1.0mg Q2h IVp prn breakthrough pain  3. Valium 5mg PO Q8h prn for muscle spasms  4. Lidocaine patch to affected area, 12 hours on, 12 hours off  Plan discussed with Dr. Rachel Brennan

## 2018-07-02 LAB — SURGICAL PATHOLOGY STUDY: SIGNIFICANT CHANGE UP

## 2018-07-04 DIAGNOSIS — I10 ESSENTIAL (PRIMARY) HYPERTENSION: ICD-10-CM

## 2018-07-04 DIAGNOSIS — M54.9 DORSALGIA, UNSPECIFIED: ICD-10-CM

## 2018-07-04 DIAGNOSIS — F17.210 NICOTINE DEPENDENCE, CIGARETTES, UNCOMPLICATED: ICD-10-CM

## 2018-07-04 DIAGNOSIS — M51.16 INTERVERTEBRAL DISC DISORDERS WITH RADICULOPATHY, LUMBAR REGION: ICD-10-CM

## 2018-07-04 DIAGNOSIS — F43.10 POST-TRAUMATIC STRESS DISORDER, UNSPECIFIED: ICD-10-CM

## 2018-07-04 DIAGNOSIS — M48.061 SPINAL STENOSIS, LUMBAR REGION WITHOUT NEUROGENIC CLAUDICATION: ICD-10-CM

## 2018-07-04 DIAGNOSIS — R73.9 HYPERGLYCEMIA, UNSPECIFIED: ICD-10-CM

## 2019-08-13 NOTE — PATIENT PROFILE ADULT. - FUNCTIONAL SCREEN CURRENT LEVEL: SWALLOWING (IF SCORE 2 OR MORE FOR ANY ITEM, CONSULT REHAB SERVICES), MLM)
Problem: Falls - Risk of:  Goal: Will remain free from falls  Description  Will remain free from falls  8/13/2019 0058 by Elizabeth Eli RN  Outcome: Ongoing  Note:   Resting in bed, high risk precautions in place. Bed alarm on, call light and bedside items within reach. Aware of limitations at this time. Will update as needed. Problem: Nutrition  Goal: Optimal nutrition therapy  8/13/2019 0058 by Elizabeth Eli RN  Note:   Currently NPO. Problem: Risk for Impaired Skin Integrity  Goal: Tissue integrity - skin and mucous membranes  Description  Structural intactness and normal physiological function of skin and  mucous membranes. Outcome: Ongoing  Note:   Making small position changes. No new s/s skin breakdown. Will update as needed. Problem: Pain:  Goal: Pain level will decrease  Description  Pain level will decrease  Note:   States pain controlled with ordered ointments. Will update as needed. (0) swallows foods/liquids without difficulty

## 2019-10-18 NOTE — PHYSICAL THERAPY INITIAL EVALUATION ADULT - LEVEL OF INDEPENDENCE: SUPINE/SIT, REHAB EVAL
minimum assist (75% patients effort)/moderate assist (50% patients effort) Exercise tolerance limited due to stroke Exercise tolerance limited due to stroke, back pain

## 2020-01-22 NOTE — ED PROVIDER NOTE - INTERPRETATION
Xeljanz Counseling: I discussed with the patient the risks of Xeljanz therapy including increased risk of infection, liver issues, headache, diarrhea, or cold symptoms. Live vaccines should be avoided. They were instructed to call if they have any problems. abnormal

## 2020-11-09 NOTE — DISCHARGE NOTE ADULT - NSTOBACCONEVERSMOKERY/N_GEN_A
Patient arrived to ED today with c/o lower back pains, spasms to his back.  Patient reports three weeks ago he has had symptoms on and off. Yes

## 2021-06-07 NOTE — PRE-OP CHECKLIST - DENTURES
no Oculoplastic Surgeon Procedure Text (D): After obtaining clear surgical margins the patient was sent to oculoplastics for surgical repair.  The patient understands they will receive post-surgical care and follow-up from the referring physician's office.

## 2021-06-09 NOTE — ED ADULT NURSE NOTE - CHIEF COMPLAINT QUOTE
" I was sent here for admission by MD Barakat, I have a reherniation, I had surgery on june 16th not examined

## 2023-01-15 NOTE — DIETITIAN INITIAL EVALUATION ADULT. - 25 CAL
Group Topic: BH Activity Group    Date: 1/15/2023  Start Time: 1030  End Time: 1100  Facilitators: Corinna Richardson    Focus: Goals and Instroduction  Number in attendance: 9    Method: Group   Attendance: Present  Participation: Moderate  Patient Response: Appeared distracted, Indifferent and Minimally attentive  Mood: Anxious  Affect: Type: Anxious   Range: Blunted/flat   Congruency: Congruent   Stability: Stable  Behavior/Socialization: Cooperative and Indifferent  Thought Process: Demonstrated insight  Task Performance: Follows directions  Patient Evaluation: Leaves class before finished       [id number]

## 2024-02-20 NOTE — PRE-OP CHECKLIST - AS TEMP SITE
CT Abdomen and pelvis as ordered.     Follow-up with ID or your PCP.     Follow-up with us as needed.   
oral